# Patient Record
Sex: FEMALE | Race: WHITE | NOT HISPANIC OR LATINO
[De-identification: names, ages, dates, MRNs, and addresses within clinical notes are randomized per-mention and may not be internally consistent; named-entity substitution may affect disease eponyms.]

---

## 2019-12-10 ENCOUNTER — APPOINTMENT (OUTPATIENT)
Dept: UROLOGY | Facility: CLINIC | Age: 64
End: 2019-12-10

## 2020-11-27 ENCOUNTER — OUTPATIENT (OUTPATIENT)
Dept: OUTPATIENT SERVICES | Facility: HOSPITAL | Age: 65
LOS: 1 days | Discharge: HOME | End: 2020-11-27
Payer: COMMERCIAL

## 2020-11-27 VITALS
HEART RATE: 88 BPM | DIASTOLIC BLOOD PRESSURE: 73 MMHG | HEIGHT: 66 IN | TEMPERATURE: 98 F | SYSTOLIC BLOOD PRESSURE: 142 MMHG | OXYGEN SATURATION: 97 % | RESPIRATION RATE: 16 BRPM | WEIGHT: 179.9 LBS

## 2020-11-27 DIAGNOSIS — Z98.890 OTHER SPECIFIED POSTPROCEDURAL STATES: Chronic | ICD-10-CM

## 2020-11-27 DIAGNOSIS — S92.414A NONDISPLACED FRACTURE OF PROXIMAL PHALANX OF RIGHT GREAT TOE, INITIAL ENCOUNTER FOR CLOSED FRACTURE: ICD-10-CM

## 2020-11-27 DIAGNOSIS — Z01.818 ENCOUNTER FOR OTHER PREPROCEDURAL EXAMINATION: ICD-10-CM

## 2020-11-27 DIAGNOSIS — Z98.84 BARIATRIC SURGERY STATUS: Chronic | ICD-10-CM

## 2020-11-27 DIAGNOSIS — S92.414D NONDISPLACED FRACTURE OF PROXIMAL PHALANX OF RIGHT GREAT TOE, SUBSEQUENT ENCOUNTER FOR FRACTURE WITH ROUTINE HEALING: ICD-10-CM

## 2020-11-27 LAB
ALBUMIN SERPL ELPH-MCNC: 4.4 G/DL — SIGNIFICANT CHANGE UP (ref 3.5–5.2)
ALP SERPL-CCNC: 84 U/L — SIGNIFICANT CHANGE UP (ref 30–115)
ALT FLD-CCNC: 13 U/L — SIGNIFICANT CHANGE UP (ref 0–41)
ANION GAP SERPL CALC-SCNC: 11 MMOL/L — SIGNIFICANT CHANGE UP (ref 7–14)
APPEARANCE UR: ABNORMAL
APTT BLD: 30 SEC — SIGNIFICANT CHANGE UP (ref 27–39.2)
AST SERPL-CCNC: 15 U/L — SIGNIFICANT CHANGE UP (ref 0–41)
BACTERIA # UR AUTO: NEGATIVE — SIGNIFICANT CHANGE UP
BASOPHILS # BLD AUTO: 0.1 K/UL — SIGNIFICANT CHANGE UP (ref 0–0.2)
BASOPHILS NFR BLD AUTO: 1.7 % — HIGH (ref 0–1)
BILIRUB SERPL-MCNC: 0.4 MG/DL — SIGNIFICANT CHANGE UP (ref 0.2–1.2)
BILIRUB UR-MCNC: NEGATIVE — SIGNIFICANT CHANGE UP
BUN SERPL-MCNC: 11 MG/DL — SIGNIFICANT CHANGE UP (ref 10–20)
CALCIUM SERPL-MCNC: 9.6 MG/DL — SIGNIFICANT CHANGE UP (ref 8.5–10.1)
CHLORIDE SERPL-SCNC: 104 MMOL/L — SIGNIFICANT CHANGE UP (ref 98–110)
CO2 SERPL-SCNC: 25 MMOL/L — SIGNIFICANT CHANGE UP (ref 17–32)
COLOR SPEC: YELLOW — SIGNIFICANT CHANGE UP
COMMENT - URINE: SIGNIFICANT CHANGE UP
CREAT SERPL-MCNC: 1.1 MG/DL — SIGNIFICANT CHANGE UP (ref 0.7–1.5)
DIFF PNL FLD: NEGATIVE — SIGNIFICANT CHANGE UP
EOSINOPHIL # BLD AUTO: 0.14 K/UL — SIGNIFICANT CHANGE UP (ref 0–0.7)
EOSINOPHIL NFR BLD AUTO: 2.3 % — SIGNIFICANT CHANGE UP (ref 0–8)
EPI CELLS # UR: 26 /HPF — HIGH (ref 0–5)
GLUCOSE SERPL-MCNC: 89 MG/DL — SIGNIFICANT CHANGE UP (ref 70–99)
GLUCOSE UR QL: NEGATIVE — SIGNIFICANT CHANGE UP
HCT VFR BLD CALC: 41.4 % — SIGNIFICANT CHANGE UP (ref 37–47)
HGB BLD-MCNC: 13.2 G/DL — SIGNIFICANT CHANGE UP (ref 12–16)
HYALINE CASTS # UR AUTO: 6 /LPF — SIGNIFICANT CHANGE UP (ref 0–7)
IMM GRANULOCYTES NFR BLD AUTO: 0.2 % — SIGNIFICANT CHANGE UP (ref 0.1–0.3)
INR BLD: 0.98 RATIO — SIGNIFICANT CHANGE UP (ref 0.65–1.3)
KETONES UR-MCNC: NEGATIVE — SIGNIFICANT CHANGE UP
LEUKOCYTE ESTERASE UR-ACNC: NEGATIVE — SIGNIFICANT CHANGE UP
LYMPHOCYTES # BLD AUTO: 1.68 K/UL — SIGNIFICANT CHANGE UP (ref 1.2–3.4)
LYMPHOCYTES # BLD AUTO: 28 % — SIGNIFICANT CHANGE UP (ref 20.5–51.1)
MCHC RBC-ENTMCNC: 29.9 PG — SIGNIFICANT CHANGE UP (ref 27–31)
MCHC RBC-ENTMCNC: 31.9 G/DL — LOW (ref 32–37)
MCV RBC AUTO: 93.9 FL — SIGNIFICANT CHANGE UP (ref 81–99)
MONOCYTES # BLD AUTO: 0.44 K/UL — SIGNIFICANT CHANGE UP (ref 0.1–0.6)
MONOCYTES NFR BLD AUTO: 7.3 % — SIGNIFICANT CHANGE UP (ref 1.7–9.3)
NEUTROPHILS # BLD AUTO: 3.64 K/UL — SIGNIFICANT CHANGE UP (ref 1.4–6.5)
NEUTROPHILS NFR BLD AUTO: 60.5 % — SIGNIFICANT CHANGE UP (ref 42.2–75.2)
NITRITE UR-MCNC: NEGATIVE — SIGNIFICANT CHANGE UP
NRBC # BLD: 0 /100 WBCS — SIGNIFICANT CHANGE UP (ref 0–0)
PH UR: 6 — SIGNIFICANT CHANGE UP (ref 5–8)
PLATELET # BLD AUTO: 429 K/UL — HIGH (ref 130–400)
POTASSIUM SERPL-MCNC: 5.1 MMOL/L — HIGH (ref 3.5–5)
POTASSIUM SERPL-SCNC: 5.1 MMOL/L — HIGH (ref 3.5–5)
PROT SERPL-MCNC: 6.8 G/DL — SIGNIFICANT CHANGE UP (ref 6–8)
PROT UR-MCNC: ABNORMAL
PROTHROM AB SERPL-ACNC: 11.3 SEC — SIGNIFICANT CHANGE UP (ref 9.95–12.87)
RBC # BLD: 4.41 M/UL — SIGNIFICANT CHANGE UP (ref 4.2–5.4)
RBC # FLD: 13.2 % — SIGNIFICANT CHANGE UP (ref 11.5–14.5)
RBC CASTS # UR COMP ASSIST: 2 /HPF — SIGNIFICANT CHANGE UP (ref 0–4)
SODIUM SERPL-SCNC: 140 MMOL/L — SIGNIFICANT CHANGE UP (ref 135–146)
SP GR SPEC: 1.03 — SIGNIFICANT CHANGE UP (ref 1.01–1.03)
UROBILINOGEN FLD QL: SIGNIFICANT CHANGE UP
WBC # BLD: 6.01 K/UL — SIGNIFICANT CHANGE UP (ref 4.8–10.8)
WBC # FLD AUTO: 6.01 K/UL — SIGNIFICANT CHANGE UP (ref 4.8–10.8)
WBC UR QL: 5 /HPF — SIGNIFICANT CHANGE UP (ref 0–5)

## 2020-11-27 PROCEDURE — 93010 ELECTROCARDIOGRAM REPORT: CPT

## 2020-11-27 RX ORDER — OMEPRAZOLE 10 MG/1
1 CAPSULE, DELAYED RELEASE ORAL
Qty: 0 | Refills: 0 | DISCHARGE

## 2020-11-27 NOTE — H&P PST ADULT - HISTORY OF PRESENT ILLNESS
Pt complains of    Denies any chest pain, difficulty breathing, SOB, palpitations, dysuria, URI, or any other infections in the last 2 weeks/1 month. Denies any recent travel, contact, or exposure to any persons with known or suspected COVID-19. Pt also denies COVID testing within the last 2 weeks. Pt advised to self quarantine until day of procedure. Exercise tolerance of 2-3 flights of stairs without dyspnea. BRONWYN reviewed with patient.    Anesthesia Alert  NO--Difficult Airway  NO--History of neck surgery or radiation  NO--Limited ROM of neck  NO--History of Malignant hyperthermia  NO--Personal or family history of Pseudocholinesterase deficiency  NO--Prior Anesthesia Complication  NO--Latex Allergy  NO--Loose teeth  NO--History of Rheumatoid Arthritis  NO--BRONWYN  NO--Other   Pt complains of right foot pain. As per pt she fell off her bad two weeks ago fracturing her fot. Currently pt is no-weight weight bearing and ambulates with a scooter. Pt reports intermittent pain 5/10, and takes Nabumetone with relief.    Denies any chest pain, difficulty breathing, SOB, palpitations, dysuria, URI, or any other infections in the last 2 weeks/1 month. Denies any recent travel, contact, or exposure to any persons with known or suspected COVID-19. Pt also denies COVID testing within the last 2 weeks. Pt advised to self quarantine until day of procedure. Exercise tolerance of 2-3 flights of stairs without dyspnea. BRONWYN reviewed with patient.    Anesthesia Alert  NO--Difficult Airway  NO--History of neck surgery or radiation  NO--Limited ROM of neck  NO--History of Malignant hyperthermia  NO--Personal or family history of Pseudocholinesterase deficiency  NO--Prior Anesthesia Complication  NO--Latex Allergy  NO--Loose teeth  NO--History of Rheumatoid Arthritis  NO--BRONWYN  NO--Other

## 2020-11-27 NOTE — H&P PST ADULT - GASTROINTESTINAL DETAILS
no rebound tenderness/no rigidity/nontender/no organomegaly/no distention/no masses palpable/no guarding/normal/soft/bowel sounds normal/no bruit

## 2020-11-27 NOTE — H&P PST ADULT - NSICDXPASTMEDICALHX_GEN_ALL_CORE_FT
PAST MEDICAL HISTORY:  Foot fracture, right x 3    Hypothyroidism      PAST MEDICAL HISTORY:  Foot fracture, right x 3    GERD (gastroesophageal reflux disease)     Hypothyroidism

## 2020-11-27 NOTE — H&P PST ADULT - NSANTHOSAYNRD_GEN_A_CORE
No. BRONWYN screening performed.  STOP BANG Legend: 0-2 = LOW Risk; 3-4 = INTERMEDIATE Risk; 5-8 = HIGH Risk

## 2020-11-27 NOTE — H&P PST ADULT - NSICDXPASTSURGICALHX_GEN_ALL_CORE_FT
PAST SURGICAL HISTORY:  LAP-BAND surgery status 2009     PAST SURGICAL HISTORY:  H/O abdominoplasty 2013    LAP-BAND surgery status 2009

## 2020-11-30 ENCOUNTER — OUTPATIENT (OUTPATIENT)
Dept: OUTPATIENT SERVICES | Facility: HOSPITAL | Age: 65
LOS: 1 days | Discharge: HOME | End: 2020-11-30

## 2020-11-30 ENCOUNTER — LABORATORY RESULT (OUTPATIENT)
Age: 65
End: 2020-11-30

## 2020-11-30 DIAGNOSIS — Z98.890 OTHER SPECIFIED POSTPROCEDURAL STATES: Chronic | ICD-10-CM

## 2020-11-30 DIAGNOSIS — Z11.59 ENCOUNTER FOR SCREENING FOR OTHER VIRAL DISEASES: ICD-10-CM

## 2020-11-30 DIAGNOSIS — Z98.84 BARIATRIC SURGERY STATUS: Chronic | ICD-10-CM

## 2020-11-30 PROBLEM — K21.9 GASTRO-ESOPHAGEAL REFLUX DISEASE WITHOUT ESOPHAGITIS: Chronic | Status: ACTIVE | Noted: 2020-11-27

## 2020-11-30 PROBLEM — S92.901A UNSPECIFIED FRACTURE OF RIGHT FOOT, INITIAL ENCOUNTER FOR CLOSED FRACTURE: Chronic | Status: ACTIVE | Noted: 2020-11-27

## 2020-11-30 PROBLEM — E03.9 HYPOTHYROIDISM, UNSPECIFIED: Chronic | Status: ACTIVE | Noted: 2020-11-27

## 2020-12-03 ENCOUNTER — OUTPATIENT (OUTPATIENT)
Dept: OUTPATIENT SERVICES | Facility: HOSPITAL | Age: 65
LOS: 1 days | Discharge: HOME | End: 2020-12-03

## 2020-12-03 VITALS
OXYGEN SATURATION: 99 % | HEART RATE: 62 BPM | DIASTOLIC BLOOD PRESSURE: 66 MMHG | SYSTOLIC BLOOD PRESSURE: 119 MMHG | RESPIRATION RATE: 14 BRPM

## 2020-12-03 VITALS
DIASTOLIC BLOOD PRESSURE: 68 MMHG | WEIGHT: 179.9 LBS | TEMPERATURE: 98 F | HEIGHT: 66 IN | RESPIRATION RATE: 18 BRPM | OXYGEN SATURATION: 99 % | HEART RATE: 73 BPM | SYSTOLIC BLOOD PRESSURE: 146 MMHG

## 2020-12-03 DIAGNOSIS — Z98.84 BARIATRIC SURGERY STATUS: Chronic | ICD-10-CM

## 2020-12-03 DIAGNOSIS — Z98.890 OTHER SPECIFIED POSTPROCEDURAL STATES: Chronic | ICD-10-CM

## 2020-12-03 RX ORDER — CEPHALEXIN 500 MG
1 CAPSULE ORAL
Qty: 28 | Refills: 0
Start: 2020-12-03 | End: 2020-12-09

## 2020-12-03 RX ORDER — IBUPROFEN 200 MG
1 TABLET ORAL
Qty: 90 | Refills: 0
Start: 2020-12-03 | End: 2021-01-01

## 2020-12-03 RX ORDER — MEPERIDINE HYDROCHLORIDE 50 MG/ML
12.5 INJECTION INTRAMUSCULAR; INTRAVENOUS; SUBCUTANEOUS ONCE
Refills: 0 | Status: DISCONTINUED | OUTPATIENT
Start: 2020-12-03 | End: 2020-12-03

## 2020-12-03 RX ORDER — ASPIRIN/CALCIUM CARB/MAGNESIUM 324 MG
1 TABLET ORAL
Qty: 60 | Refills: 0
Start: 2020-12-03 | End: 2021-01-01

## 2020-12-03 RX ORDER — HYDROMORPHONE HYDROCHLORIDE 2 MG/ML
0.5 INJECTION INTRAMUSCULAR; INTRAVENOUS; SUBCUTANEOUS
Refills: 0 | Status: DISCONTINUED | OUTPATIENT
Start: 2020-12-03 | End: 2020-12-03

## 2020-12-03 RX ORDER — ONDANSETRON 8 MG/1
4 TABLET, FILM COATED ORAL ONCE
Refills: 0 | Status: DISCONTINUED | OUTPATIENT
Start: 2020-12-03 | End: 2020-12-17

## 2020-12-03 RX ORDER — OXYCODONE HYDROCHLORIDE 5 MG/1
5 TABLET ORAL ONCE
Refills: 0 | Status: DISCONTINUED | OUTPATIENT
Start: 2020-12-03 | End: 2020-12-03

## 2020-12-03 RX ORDER — HYDROMORPHONE HYDROCHLORIDE 2 MG/ML
1 INJECTION INTRAMUSCULAR; INTRAVENOUS; SUBCUTANEOUS
Refills: 0 | Status: DISCONTINUED | OUTPATIENT
Start: 2020-12-03 | End: 2020-12-03

## 2020-12-03 RX ORDER — SODIUM CHLORIDE 9 MG/ML
1000 INJECTION, SOLUTION INTRAVENOUS
Refills: 0 | Status: DISCONTINUED | OUTPATIENT
Start: 2020-12-03 | End: 2020-12-17

## 2020-12-03 RX ADMIN — HYDROMORPHONE HYDROCHLORIDE 1 MILLIGRAM(S): 2 INJECTION INTRAMUSCULAR; INTRAVENOUS; SUBCUTANEOUS at 09:02

## 2020-12-03 RX ADMIN — SODIUM CHLORIDE 100 MILLILITER(S): 9 INJECTION, SOLUTION INTRAVENOUS at 09:15

## 2020-12-03 RX ADMIN — OXYCODONE HYDROCHLORIDE 5 MILLIGRAM(S): 5 TABLET ORAL at 10:20

## 2020-12-03 NOTE — BRIEF OPERATIVE NOTE - NSICDXBRIEFPOSTOP_GEN_ALL_CORE_FT
POST-OP DIAGNOSIS:  Sprain of tarsometatarsal ligament of right foot, initial encounter 03-Dec-2020 09:03:10  Mily Durham

## 2020-12-03 NOTE — ASU DISCHARGE PLAN (ADULT/PEDIATRIC) - ASU DC SPECIAL INSTRUCTIONSFT
nonweightbearing right lower extremity.  elevate, ice, pain control.  baby aspirin twice a day for DVT prophylaxis.  1 week of oral antibiotics.  follow up in 2 weeks for suture removal and xrays.  bring CAM boot to follow up appt.

## 2020-12-03 NOTE — ASU DISCHARGE PLAN (ADULT/PEDIATRIC) - CALL YOUR DOCTOR IF YOU HAVE ANY OF THE FOLLOWING:
Bleeding that does not stop/Swelling that gets worse/Numbness, tingling, color or temperature change to extremity/Wound/Surgical Site with redness, or foul smelling discharge or pus/Pain not relieved by Medications/Fever greater than (need to indicate Fahrenheit or Celsius)

## 2020-12-03 NOTE — ASU DISCHARGE PLAN (ADULT/PEDIATRIC) - CARE PROVIDER_API CALL
JOELLE WATSON  ORTHOPAEDIC SURGERY  [address] [place] [address]  Phone: [phone]  Fax: [phone]  Established Patient  Follow Up Time: 2 weeks

## 2020-12-03 NOTE — BRIEF OPERATIVE NOTE - NSICDXBRIEFPREOP_GEN_ALL_CORE_FT
PRE-OP DIAGNOSIS:  Sprain of tarsometatarsal ligament of right foot, initial encounter 03-Dec-2020 09:03:02  Mily Durham

## 2020-12-03 NOTE — ASU DISCHARGE PLAN (ADULT/PEDIATRIC) - PATIENT EDUCATION MATERIALS PROVIED
pain management/Provider pre-printed instructions given/Pre-printed instructions given for other (specify)

## 2020-12-03 NOTE — BRIEF OPERATIVE NOTE - NSICDXBRIEFPROCEDURE_GEN_ALL_CORE_FT
PROCEDURES:  Open reduction and internal fixation (ORIF) of tarsometatarsal joint of right foot 03-Dec-2020 09:02:04  Mily Durham

## 2020-12-05 DIAGNOSIS — S93.324A DISLOCATION OF TARSOMETATARSAL JOINT OF RIGHT FOOT, INITIAL ENCOUNTER: ICD-10-CM
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
## 2020-12-05 DIAGNOSIS — E03.9 HYPOTHYROIDISM, UNSPECIFIED: ICD-10-CM

## 2020-12-05 DIAGNOSIS — Z98.84 BARIATRIC SURGERY STATUS: ICD-10-CM

## 2020-12-05 DIAGNOSIS — E66.9 OBESITY, UNSPECIFIED: ICD-10-CM

## 2020-12-05 DIAGNOSIS — K21.9 GASTRO-ESOPHAGEAL REFLUX DISEASE WITHOUT ESOPHAGITIS: ICD-10-CM
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
## 2020-12-05 DIAGNOSIS — Z79.82 LONG TERM (CURRENT) USE OF ASPIRIN: ICD-10-CM

## 2022-06-09 ENCOUNTER — INPATIENT (INPATIENT)
Facility: HOSPITAL | Age: 67
LOS: 1 days | Discharge: HOME | End: 2022-06-11
Attending: INTERNAL MEDICINE | Admitting: INTERNAL MEDICINE
Payer: COMMERCIAL

## 2022-06-09 VITALS
RESPIRATION RATE: 18 BRPM | WEIGHT: 179.9 LBS | HEART RATE: 73 BPM | OXYGEN SATURATION: 99 % | HEIGHT: 66 IN | DIASTOLIC BLOOD PRESSURE: 52 MMHG | SYSTOLIC BLOOD PRESSURE: 106 MMHG | TEMPERATURE: 96 F

## 2022-06-09 DIAGNOSIS — Z98.84 BARIATRIC SURGERY STATUS: Chronic | ICD-10-CM

## 2022-06-09 DIAGNOSIS — Z98.890 OTHER SPECIFIED POSTPROCEDURAL STATES: Chronic | ICD-10-CM

## 2022-06-09 LAB
ALBUMIN SERPL ELPH-MCNC: 4.3 G/DL — SIGNIFICANT CHANGE UP (ref 3.5–5.2)
ALP SERPL-CCNC: 104 U/L — SIGNIFICANT CHANGE UP (ref 30–115)
ALT FLD-CCNC: 20 U/L — SIGNIFICANT CHANGE UP (ref 0–41)
ANION GAP SERPL CALC-SCNC: 13 MMOL/L — SIGNIFICANT CHANGE UP (ref 7–14)
AST SERPL-CCNC: 23 U/L — SIGNIFICANT CHANGE UP (ref 0–41)
BASOPHILS # BLD AUTO: 0.05 K/UL — SIGNIFICANT CHANGE UP (ref 0–0.2)
BASOPHILS NFR BLD AUTO: 0.6 % — SIGNIFICANT CHANGE UP (ref 0–1)
BILIRUB DIRECT SERPL-MCNC: <0.2 MG/DL — SIGNIFICANT CHANGE UP (ref 0–0.3)
BILIRUB INDIRECT FLD-MCNC: >0.1 MG/DL — LOW (ref 0.2–1.2)
BILIRUB SERPL-MCNC: 0.3 MG/DL — SIGNIFICANT CHANGE UP (ref 0.2–1.2)
BUN SERPL-MCNC: 14 MG/DL — SIGNIFICANT CHANGE UP (ref 10–20)
C DIFF BY PCR RESULT: NEGATIVE — SIGNIFICANT CHANGE UP
C DIFF TOX GENS STL QL NAA+PROBE: SIGNIFICANT CHANGE UP
CALCIUM SERPL-MCNC: 8.8 MG/DL — SIGNIFICANT CHANGE UP (ref 8.5–10.1)
CHLORIDE SERPL-SCNC: 102 MMOL/L — SIGNIFICANT CHANGE UP (ref 98–110)
CO2 SERPL-SCNC: 22 MMOL/L — SIGNIFICANT CHANGE UP (ref 17–32)
CREAT SERPL-MCNC: 1.1 MG/DL — SIGNIFICANT CHANGE UP (ref 0.7–1.5)
EGFR: 56 ML/MIN/1.73M2 — LOW
EOSINOPHIL # BLD AUTO: 0.12 K/UL — SIGNIFICANT CHANGE UP (ref 0–0.7)
EOSINOPHIL NFR BLD AUTO: 1.5 % — SIGNIFICANT CHANGE UP (ref 0–8)
GLUCOSE SERPL-MCNC: 118 MG/DL — HIGH (ref 70–99)
HCT VFR BLD CALC: 40.6 % — SIGNIFICANT CHANGE UP (ref 37–47)
HGB BLD-MCNC: 13 G/DL — SIGNIFICANT CHANGE UP (ref 12–16)
IMM GRANULOCYTES NFR BLD AUTO: 0.2 % — SIGNIFICANT CHANGE UP (ref 0.1–0.3)
LACTATE SERPL-SCNC: 0.9 MMOL/L — SIGNIFICANT CHANGE UP (ref 0.7–2)
LIDOCAIN IGE QN: 23 U/L — SIGNIFICANT CHANGE UP (ref 7–60)
LYMPHOCYTES # BLD AUTO: 1.66 K/UL — SIGNIFICANT CHANGE UP (ref 1.2–3.4)
LYMPHOCYTES # BLD AUTO: 20.2 % — LOW (ref 20.5–51.1)
MCHC RBC-ENTMCNC: 29 PG — SIGNIFICANT CHANGE UP (ref 27–31)
MCHC RBC-ENTMCNC: 32 G/DL — SIGNIFICANT CHANGE UP (ref 32–37)
MCV RBC AUTO: 90.4 FL — SIGNIFICANT CHANGE UP (ref 81–99)
MONOCYTES # BLD AUTO: 0.76 K/UL — HIGH (ref 0.1–0.6)
MONOCYTES NFR BLD AUTO: 9.3 % — SIGNIFICANT CHANGE UP (ref 1.7–9.3)
NEUTROPHILS # BLD AUTO: 5.59 K/UL — SIGNIFICANT CHANGE UP (ref 1.4–6.5)
NEUTROPHILS NFR BLD AUTO: 68.2 % — SIGNIFICANT CHANGE UP (ref 42.2–75.2)
NRBC # BLD: 0 /100 WBCS — SIGNIFICANT CHANGE UP (ref 0–0)
PLATELET # BLD AUTO: 349 K/UL — SIGNIFICANT CHANGE UP (ref 130–400)
POTASSIUM SERPL-MCNC: 3.6 MMOL/L — SIGNIFICANT CHANGE UP (ref 3.5–5)
POTASSIUM SERPL-SCNC: 3.6 MMOL/L — SIGNIFICANT CHANGE UP (ref 3.5–5)
PROT SERPL-MCNC: 6.9 G/DL — SIGNIFICANT CHANGE UP (ref 6–8)
RBC # BLD: 4.49 M/UL — SIGNIFICANT CHANGE UP (ref 4.2–5.4)
RBC # FLD: 13.4 % — SIGNIFICANT CHANGE UP (ref 11.5–14.5)
SARS-COV-2 RNA SPEC QL NAA+PROBE: SIGNIFICANT CHANGE UP
SODIUM SERPL-SCNC: 137 MMOL/L — SIGNIFICANT CHANGE UP (ref 135–146)
TROPONIN T SERPL-MCNC: <0.01 NG/ML — SIGNIFICANT CHANGE UP
WBC # BLD: 8.2 K/UL — SIGNIFICANT CHANGE UP (ref 4.8–10.8)
WBC # FLD AUTO: 8.2 K/UL — SIGNIFICANT CHANGE UP (ref 4.8–10.8)

## 2022-06-09 PROCEDURE — 99285 EMERGENCY DEPT VISIT HI MDM: CPT

## 2022-06-09 PROCEDURE — 74177 CT ABD & PELVIS W/CONTRAST: CPT | Mod: 26,MA

## 2022-06-09 PROCEDURE — 93010 ELECTROCARDIOGRAM REPORT: CPT

## 2022-06-09 PROCEDURE — 99223 1ST HOSP IP/OBS HIGH 75: CPT

## 2022-06-09 PROCEDURE — 99284 EMERGENCY DEPT VISIT MOD MDM: CPT

## 2022-06-09 PROCEDURE — 71045 X-RAY EXAM CHEST 1 VIEW: CPT | Mod: 26

## 2022-06-09 PROCEDURE — 74240 X-RAY XM UPR GI TRC 1CNTRST: CPT | Mod: 26

## 2022-06-09 RX ORDER — ONDANSETRON 8 MG/1
4 TABLET, FILM COATED ORAL ONCE
Refills: 0 | Status: COMPLETED | OUTPATIENT
Start: 2022-06-09 | End: 2022-06-09

## 2022-06-09 RX ORDER — MORPHINE SULFATE 50 MG/1
4 CAPSULE, EXTENDED RELEASE ORAL ONCE
Refills: 0 | Status: DISCONTINUED | OUTPATIENT
Start: 2022-06-09 | End: 2022-06-09

## 2022-06-09 RX ORDER — SODIUM CHLORIDE 9 MG/ML
1000 INJECTION, SOLUTION INTRAVENOUS ONCE
Refills: 0 | Status: COMPLETED | OUTPATIENT
Start: 2022-06-09 | End: 2022-06-09

## 2022-06-09 RX ORDER — DIATRIZOATE MEGLUMINE 180 MG/ML
30 INJECTION, SOLUTION INTRAVESICAL ONCE
Refills: 0 | Status: COMPLETED | OUTPATIENT
Start: 2022-06-09 | End: 2022-06-09

## 2022-06-09 RX ORDER — FAMOTIDINE 10 MG/ML
20 INJECTION INTRAVENOUS ONCE
Refills: 0 | Status: COMPLETED | OUTPATIENT
Start: 2022-06-09 | End: 2022-06-09

## 2022-06-09 RX ORDER — ACETAMINOPHEN 500 MG
650 TABLET ORAL EVERY 6 HOURS
Refills: 0 | Status: DISCONTINUED | OUTPATIENT
Start: 2022-06-09 | End: 2022-06-11

## 2022-06-09 RX ORDER — SODIUM CHLORIDE 9 MG/ML
1000 INJECTION INTRAMUSCULAR; INTRAVENOUS; SUBCUTANEOUS ONCE
Refills: 0 | Status: COMPLETED | OUTPATIENT
Start: 2022-06-09 | End: 2022-06-09

## 2022-06-09 RX ORDER — NABUMETONE 750 MG
2 TABLET ORAL
Qty: 0 | Refills: 0 | DISCHARGE

## 2022-06-09 RX ORDER — PANTOPRAZOLE SODIUM 20 MG/1
40 TABLET, DELAYED RELEASE ORAL
Refills: 0 | Status: DISCONTINUED | OUTPATIENT
Start: 2022-06-09 | End: 2022-06-11

## 2022-06-09 RX ORDER — LEVOTHYROXINE SODIUM 125 MCG
88 TABLET ORAL DAILY
Refills: 0 | Status: DISCONTINUED | OUTPATIENT
Start: 2022-06-09 | End: 2022-06-11

## 2022-06-09 RX ADMIN — ONDANSETRON 4 MILLIGRAM(S): 8 TABLET, FILM COATED ORAL at 02:16

## 2022-06-09 RX ADMIN — ONDANSETRON 4 MILLIGRAM(S): 8 TABLET, FILM COATED ORAL at 06:00

## 2022-06-09 RX ADMIN — DIATRIZOATE MEGLUMINE 30 MILLILITER(S): 180 INJECTION, SOLUTION INTRAVESICAL at 02:34

## 2022-06-09 RX ADMIN — FAMOTIDINE 20 MILLIGRAM(S): 10 INJECTION INTRAVENOUS at 02:16

## 2022-06-09 RX ADMIN — MORPHINE SULFATE 4 MILLIGRAM(S): 50 CAPSULE, EXTENDED RELEASE ORAL at 02:16

## 2022-06-09 RX ADMIN — MORPHINE SULFATE 4 MILLIGRAM(S): 50 CAPSULE, EXTENDED RELEASE ORAL at 06:00

## 2022-06-09 RX ADMIN — Medication 650 MILLIGRAM(S): at 17:51

## 2022-06-09 RX ADMIN — MORPHINE SULFATE 4 MILLIGRAM(S): 50 CAPSULE, EXTENDED RELEASE ORAL at 11:10

## 2022-06-09 RX ADMIN — Medication 650 MILLIGRAM(S): at 17:02

## 2022-06-09 RX ADMIN — ONDANSETRON 4 MILLIGRAM(S): 8 TABLET, FILM COATED ORAL at 03:05

## 2022-06-09 RX ADMIN — MORPHINE SULFATE 4 MILLIGRAM(S): 50 CAPSULE, EXTENDED RELEASE ORAL at 10:39

## 2022-06-09 RX ADMIN — SODIUM CHLORIDE 1000 MILLILITER(S): 9 INJECTION, SOLUTION INTRAVENOUS at 03:52

## 2022-06-09 RX ADMIN — SODIUM CHLORIDE 1000 MILLILITER(S): 9 INJECTION, SOLUTION INTRAVENOUS at 06:21

## 2022-06-09 RX ADMIN — ONDANSETRON 4 MILLIGRAM(S): 8 TABLET, FILM COATED ORAL at 08:50

## 2022-06-09 RX ADMIN — SODIUM CHLORIDE 1000 MILLILITER(S): 9 INJECTION INTRAMUSCULAR; INTRAVENOUS; SUBCUTANEOUS at 02:20

## 2022-06-09 RX ADMIN — MORPHINE SULFATE 4 MILLIGRAM(S): 50 CAPSULE, EXTENDED RELEASE ORAL at 03:14

## 2022-06-09 NOTE — ED PROVIDER NOTE - ATTENDING APP SHARED VISIT CONTRIBUTION OF CARE
66-year-old female with history of GERD, hypothyroidism, gastric sleeve done 12 years ago, here for assessment of episodic abdominal pain and profuse diarrhea.  Patient notes symptoms have been on and off for 1 month or significantly worse today.  Patient had stool incontinence which she has not had previously.  She is also had multiple episodes of nausea, nonbloody, nonbilious vomiting.  Patient notes that after large BM she felt lightheaded and passed out.  No head trauma.    Patient tachy, appears uncomfortable.  She has clear lungs, regular rhythm, hyperactive bowel sounds, diffusely tender, nondistended. She appears slightly dehydrated with cracked lips.    Labs are normal without signs of acute infectious process, pancreatitis, biliary disease or profound dehydration.    Prelim read on CT scan demonstrates slow transition of contrast but also dilated, fluid-filled loops of bowel suggestive of either ileus or diarrhea.    Stool cultures and C. difficile are pending.    While reassessing the patient she was noted to become significantly more tachycardic, EKG done at that time noted a flutter with RVR.  At the same reassessment, patient went back into normal sinus at regular rate.    Concern for structural abnormalities secondary to bariatric surgery versus infectious/inflammatory diarrhea.  Profuse diarrhea likely causing arrhythmia, however in setting of syncope, have to consider primary arrhythmia as well.    Surgical consult pending for further recommendations specifically whether the patient needs to be transferred to the [facility] site for bariatric eval.    Regardless, will need admission to telemetry for new onset, possibly paroxysmal a flutter and intractable diarrhea.    Of note, the patient has required 16 mg of morphine and multiple doses of antiemetic while in the ED.

## 2022-06-09 NOTE — ED PROVIDER NOTE - PHYSICAL EXAMINATION
VITAL SIGNS: I have reviewed nursing notes and confirm.  CONSTITUTIONAL: 67 yo F laying on stretcher in mild distress.  SKIN: Skin exam is warm and dry, no acute rash.  HEAD: Normocephalic; atraumatic.  EYES: Conjunctiva and sclera clear.  ENT: No nasal discharge; airway clear.   NECK: Supple; non tender.  CARD: S1, S2 normal; no murmurs, gallops, or rubs. Regular rate and rhythm.  RESP: No wheezes, rales or rhonchi.   ABD: Normal bowel sounds; soft; non-distended; (+) diffuse TTP; No rebound or guarding. No CVA tenderness.  EXT: Normal ROM.   NEURO: Alert, oriented. Grossly unremarkable. No focal deficits.

## 2022-06-09 NOTE — H&P ADULT - ASSESSMENT
66F w/ h/o hypothyroidism, GERD, and s/p gastric band p/w abdominal pain a/w non-bloody, watery diarrhea.    #Abdominal Pain  Initially p/w severe abdominal pain a/w diarrhea. S/P gastric band 2009. Admission CTAP showed evidence of diarrhea and possible ileus. Also showed delayed passage of oral contrast. Follow-up Upper GI series showed esophageal dysmotility and significant delay in passage of contrast through lap band. C diff negative. No acute surgical intervention per bariatric surgery.      #Hypothyroidism  - c/w synthroid 88mcg PO QD  - check TSH    #GERD  - c/w pantoprazole 40mg PO QD (therapeutic interchange for omeprazole 40 QD)    DVT PPX: none indicated (IMPROVEDD score 1)  GI PPX: pantoprazole 40mg PO QD  DIET: Clear Liquids  ACTIVITY: IAT  CODE STATUS: Full Code  DISPOSITION: From Home    PENDING:  66F w/ h/o hypothyroidism, GERD, and s/p gastric band p/w abdominal pain a/w non-bloody, watery diarrhea.    #Abdominal Pain  #Diarrhea  Initially p/w severe abdominal pain a/w diarrhea. S/P gastric band 2009. Admission CTAP showed evidence of diarrhea and possible ileus. Also showed delayed passage of oral contrast. Follow-up Upper GI series showed esophageal dysmotility and significant delay in passage of contrast through lap band. C diff negative. No acute surgical intervention per bariatric surgery.  - f/u stool culture  - f/u GI stool PCR    #Tachycardia  In ED, pt noted to be tachycardic to 's. EKG at the time concerning for aflutter w/ RVR vs sinus tachycardia w/ PVC's. Spontaneously converted back to NSR w/o intervention.  - c/w telemetry monitoring    #Hypothyroidism  - c/w synthroid 88mcg PO QD  - check TSH    #GERD  - c/w pantoprazole 40mg PO QD (therapeutic interchange for omeprazole 40 QD)    DVT PPX: none indicated (IMPROVEDD score 1)  GI PPX: pantoprazole 40mg PO QD  DIET: Clear Liquids  ACTIVITY: IAT  CODE STATUS: Full Code  DISPOSITION: From Home    PENDING: stool cx; GI stool PCR 66F w/ h/o hypothyroidism, GERD, and s/p gastric band p/w abdominal pain a/w non-bloody, watery diarrhea.    #Abdominal Pain  #Diarrhea  Initially p/w severe abdominal pain a/w diarrhea. S/P gastric band 2009. Admission CTAP showed evidence of diarrhea and possible ileus. Also showed delayed passage of oral contrast. Follow-up Upper GI series showed esophageal dysmotility and significant delay in passage of contrast through lap band. C diff negative. No acute surgical intervention per bariatric surgery.  - Diet: Clear Liquids (per bariatric sx after reviewing upper GI)  - f/u stool culture, GI stool PCR, calprotectin, and lactoferrin  - consider GI consult for inpatient EGD per bariatric sx    #Tachycardia  In ED, pt noted to be tachycardic to 's. EKG at the time concerning for aflutter w/ RVR vs sinus tachycardia w/ PVC's. Spontaneously converted back to NSR w/o intervention.  - c/w telemetry monitoring  - repeat EKG in AM    #Hypothyroidism  - c/w synthroid 88mcg PO QD  - check TSH    #GERD  - c/w pantoprazole 40mg PO QD (therapeutic interchange for omeprazole 40 QD)    DVT PPX: none indicated (IMPROVEDD score 1)  GI PPX: pantoprazole 40mg PO QD  DIET: Clear Liquids  ACTIVITY: IAT  CODE STATUS: Full Code  DISPOSITION: From Home    PENDING: stool studies (Cx, GI stool PCR, calprotectin, lactoferrin)

## 2022-06-09 NOTE — ED PROVIDER NOTE - NS ED ATTENDING STATEMENT MOD
This was a shared visit with the BUDDY. I reviewed and verified the documentation and independently performed the documented:

## 2022-06-09 NOTE — ED ADULT NURSE NOTE - NSICDXPASTMEDICALHX_GEN_ALL_CORE_FT
PAST MEDICAL HISTORY:  Foot fracture, right x 3    GERD (gastroesophageal reflux disease)     Hypothyroidism

## 2022-06-09 NOTE — ED PROVIDER NOTE - OBJECTIVE STATEMENT
66-year-old female with past medical history of hypothyroidism, GERD and gastric sleeve presents to the ED complaining of persisting diffuse abdominal pain and non-bloody diarrhea over the last week with worsening of symptoms tonight.  Pain is cramp-like, constant, and associated with nausea.  Patient went to the bathroom today after having a bowel movement ended up passing out.  Patient states she did not sustain any head injury.  No seizure-like activity occurred.  Denies any recent travel or sick contacts.  She denies other complaints.

## 2022-06-09 NOTE — ED ADULT TRIAGE NOTE - CHIEF COMPLAINT QUOTE
PT c/o abdominal pain and diarrhea for a few days. PT states she had a syncopal episode in her bathroom. Denies hitting her head.

## 2022-06-09 NOTE — ED PROVIDER NOTE - CARE PLAN
Principal Discharge DX:	Intractable abdominal pain  Secondary Diagnosis:	Diarrhea  Secondary Diagnosis:	Ileus  Secondary Diagnosis:	New onset atrial flutter   1 Principal Discharge DX:	Intractable abdominal pain  Secondary Diagnosis:	Diarrhea  Secondary Diagnosis:	Ileus  Secondary Diagnosis:	New onset atrial flutter  Secondary Diagnosis:	Syncope

## 2022-06-09 NOTE — ED PROVIDER NOTE - CLINICAL SUMMARY MEDICAL DECISION MAKING FREE TEXT BOX
Transfer from [facility].  Patient with bariatric surgery 12 years ago, has ileus on CT.  Given multiple rounds of pain medication antiemetics there.  Mild left lower quadrant tenderness on my exam, no peritonitis.  Overall nontoxic.  Also had atrial fibrillation there which is new diagnosis.  Now in sinus rhythm.  Seen by surgery, and they do not feel needs acute intervention.  Admitted to medicine.

## 2022-06-09 NOTE — H&P ADULT - NSHPSOCIALHISTORY_GEN_ALL_CORE
Pt is  and lives with . Has been retired for 10 years and previously worked for [employer]. No reported tobacco, alcohol, or illicit drug use.

## 2022-06-09 NOTE — CONSULT NOTE ADULT - SUBJECTIVE AND OBJECTIVE BOX
Patient is a 65 y/o F with a pmhx of hypothyroidism, GERD and gastric lap band ~12 years ago at [facility] and abdominoplasty ~7 years ago who reports to the ER complaining of persistent diffuse abdominal pain associated with multiple episodes of non bloody diarrhea and vomiting x 1 week. Abdominal pain described as "sharp" in nature rating it a 10/10 in intensity. Patient states after passing a bowel movement tonight, she had a syncopal episode with worsening abdominal pain prompting evaluation to the ER. Patient states no similar episodes of this in the past. Patient denies fever, chills, melena, hematochezia, hematemesis. CT was done in the ER which shows diarrhea and possible ileus. Patient was seen and examined at bedside. Patient reports improvement of abdominal pain after the morphine that was given in the ER.    PSHx: Gastric lap band ~12 years ago  Abdominoplasty ~7 years ago    etOH: Denies  smoking: Denies  Illicit drugs: Denies       Vital Signs Last 24 Hrs  T(C): 35.9 (09 Jun 2022 03:35), Max: 35.9 (09 Jun 2022 03:35)  T(F): 96.7 (09 Jun 2022 03:35), Max: 96.7 (09 Jun 2022 03:35)  HR: 107 (09 Jun 2022 03:35) (73 - 107)  BP: 119/62 (09 Jun 2022 03:35) (106/52 - 119/62)  RR: 18 (09 Jun 2022 03:35) (18 - 18)  SpO2: 95% (09 Jun 2022 03:35) (95% - 99%)    ++++++++++++++++++++++++++++++++++++++++++++++++++++++++++++++++++++  < from: CT Abdomen and Pelvis w/ Oral Cont and w/ IV Cont (06.09.22 @ 05:26) >  IMPRESSION:      1.  Status post gastric lap band with oral contrast visualized within the   proximal stomach and dilated esophagus. No contrast seen distally.   Findings may suggest delayed delayed passage of oral contrast. Consider   fluoroscopic upper GI series if clinically warranted.  2.  Distal gastric distention and diffusely dilated fluid-filled loops of   small and large bowel extending to the rectum. Findings compatible with   diarrhea and possible ileus.  3.  Mild intrahepatic biliary ductal dilatation is of uncertain etiology.   This may be secondary to generalized ileus as well. Nonemergent   outpatient MRI/MRCP may be of benefit.    --- End of Report ---    BRANDON FLORES MD; Resident Radiologist  This document has been electronically signed.  MESFIN COLLINS MD;Attending Radiologist  This document has been electronically signed. Jun 9 2022  6:09AM    < end of copied text >  +++++++++++++++++++++++++++++++++++++++++++++++++++++++++++++++++++++++++++++    GENERAL: NAD, lying in bed comfortably  HEAD:  Atraumatic, Normocephalic  EYES: EOMI, PERRLA, conjunctiva and sclera clear  ENT: Moist mucous membranes  NECK: Supple, No JVD  CHEST/LUNG: Clear to auscultation bilaterally; No rales, rhonchi, wheezing, or rubs. Unlabored respirations  HEART: Regular rate and rhythm; No murmurs, rubs, or gallops  ABDOMEN: Bowel sounds present; Soft, Nontender, Nondistended. No hepatomegally  EXTREMITIES:  2+ Peripheral Pulses, brisk capillary refill. No clubbing, cyanosis, or edema  NERVOUS SYSTEM:  Alert & Oriented X3, speech clear. No deficits   MSK: FROM all 4 extremities, full and equal strength  SKIN: No rashes or lesions Patient is a 67 y/o F with a pmhx of hypothyroidism, GERD and gastric lap band ~12 years ago at [facility] and abdominoplasty ~7 years ago who reports to the ER complaining of persistent diffuse abdominal pain associated with multiple episodes of non bloody diarrhea and vomiting x 1 week. Abdominal pain described as "sharp" in nature rating it a 10/10 in intensity. Patient states after passing a bowel movement tonight, she had a syncopal episode with worsening abdominal pain prompting evaluation to the ER. Patient states no similar episodes of this in the past. Patient denies fever, chills, melena, hematochezia, hematemesis. CT was done in the ER which shows diarrhea and possible ileus. Patient was seen and examined at bedside. Patient reports improvement of abdominal pain after the morphine that was given in the ER.    PSHx: Gastric lap band ~12 years ago  Abdominoplasty ~7 years ago    etOH: Denies  smoking: Denies  Illicit drugs: Denies       Vital Signs Last 24 Hrs  T(C): 35.9 (09 Jun 2022 03:35), Max: 35.9 (09 Jun 2022 03:35)  T(F): 96.7 (09 Jun 2022 03:35), Max: 96.7 (09 Jun 2022 03:35)  HR: 107 (09 Jun 2022 03:35) (73 - 107)  BP: 119/62 (09 Jun 2022 03:35) (106/52 - 119/62)  RR: 18 (09 Jun 2022 03:35) (18 - 18)  SpO2: 95% (09 Jun 2022 03:35) (95% - 99%)    ++++++++++++++++++++++++++++++++++++++++++++++++++++++++++++++++++++  < from: CT Abdomen and Pelvis w/ Oral Cont and w/ IV Cont (06.09.22 @ 05:26) >  IMPRESSION:      1.  Status post gastric lap band with oral contrast visualized within the   proximal stomach and dilated esophagus. No contrast seen distally.   Findings may suggest delayed delayed passage of oral contrast. Consider   fluoroscopic upper GI series if clinically warranted.  2.  Distal gastric distention and diffusely dilated fluid-filled loops of   small and large bowel extending to the rectum. Findings compatible with   diarrhea and possible ileus.  3.  Mild intrahepatic biliary ductal dilatation is of uncertain etiology.   This may be secondary to generalized ileus as well. Nonemergent   outpatient MRI/MRCP may be of benefit.    --- End of Report ---    BRANDON FLORES MD; Resident Radiologist  This document has been electronically signed.  MESFIN COLLINS MD;Attending Radiologist  This document has been electronically signed. Jun 9 2022  6:09AM    < end of copied text >  +++++++++++++++++++++++++++++++++++++++++++++++++++++++++++++++++++++++++++++    GENERAL: NAD, lying in bed comfortably  HEAD:  Atraumatic, Normocephalic  EYES: EOMI, PERRLA, conjunctiva and sclera clear  ENT: Moist mucous membranes  NECK: Supple, No JVD  CHEST/LUNG: Clear to auscultation bilaterally; No rales, rhonchi, wheezing, or rubs. Unlabored respirations  HEART: Regular rate and rhythm; No murmurs, rubs, or gallops  ABDOMEN: Bowel sounds present; Soft, (+) tenderness to epigastric and mid-LLQ, Nondistended. No hepatomegally  EXTREMITIES:  2+ Peripheral Pulses, brisk capillary refill. No clubbing, cyanosis, or edema  NERVOUS SYSTEM:  Alert & Oriented X3, speech clear. No deficits   MSK: FROM all 4 extremities, full and equal strength  SKIN: No rashes or lesions

## 2022-06-09 NOTE — ED PROVIDER NOTE - NS ED ROS FT
Review of Systems  Constitutional:  No fever, chills.  Eyes:  No visual changes, eye pain, or discharge.  ENMT:  No hearing changes, pain, or discharge. No nasal congestion, discharge, or bleeding. No throat pain, swelling, or difficulty swallowing.  Cardiac:  No chest pain, palpitations, syncope, or edema.  Respiratory:  No dyspnea, cough. No hemoptysis.  GI:  (+) nausea, abd pain, diarrhea  :  No dysuria, hematuria, frequency, or burning.   MS:  No back pain.  Skin:  No skin rash, pruritis, jaundice, or lesions.  Neuro:  No headache, dizziness, loss of sensation, or focal weakness.  No change in mental status.

## 2022-06-09 NOTE — CONSULT NOTE ADULT - ASSESSMENT
Assessment:  Patient is a 67 y/o F with a pmhx of hypothyroidism, GERD and gastric lap band ~12 years ago at [facility] and abdominoplasty ~7 years ago who reports to the ER complaining of persistent diffuse abdominal pain associated with multiple episodes of non bloody diarrhea and vomiting x 1 week.    Plan:        Above discussed with Dr. Bell Assessment:  Patient is a 65 y/o F with a pmhx of hypothyroidism, GERD and gastric lap band ~12 years ago at [facility] and abdominoplasty ~7 years ago who reports to the ER complaining of persistent diffuse abdominal pain associated with multiple episodes of non bloody diarrhea and vomiting x 1 week.    Plan:  Monitor labs and vital signs.  Pain control.  Patient to be transferred to [facility] to bariatric team for further management.       Above discussed with Dr. Bell Assessment:  Patient is a 67 y/o F with a pmhx of hypothyroidism, GERD and gastric lap band ~12 years ago at [facility] and abdominoplasty ~7 years ago who reports to the ER complaining of persistent diffuse abdominal pain associated with multiple episodes of non bloody diarrhea and vomiting x 1 week.    Plan:  Monitor labs and vital signs.  Pain control.  Patient to be transferred to [facility] to bariatric team for further management.     Case discussed with Dr. Martinez from general surgery team at [facility].     Above discussed with Dr. Bell

## 2022-06-09 NOTE — H&P ADULT - HISTORY OF PRESENT ILLNESS
66F w/ h/o hypothyroidism, GERD, and s/p gastric sleeve p/w abdominal pain a/w non-bloody, watery diarrhea. Diarrhea has been intermittently ongoing for 1 month. However, abdominal pain started last week. Pain described as persistent, diffuse, and cramping in nature, but has been gradually worsening over past week. Unknown aggravating factors, but alleviated w/ opiates (received morphine in ED w/ significant relief). Came to ED after pt found passed out on floor secondary to pain. Patient has never experienced anything similar before. No head trauma or headache. No reported fevers, chills, CP, palpitations, SOB, vomiting, dysuria, or urinary frequency.    Initially presented to [facility], but transferred to [facility] for evaluation by bariatric surgery team. Prior to transfer, pt tachy to 's. EKG showed aflutter vs sinus tach w/ PAC's. Subsequently admitted to telemetry for further workup. 66F w/ h/o hypothyroidism, GERD, and s/p gastric band p/w abdominal pain a/w non-bloody, watery diarrhea. Diarrhea has been ongoing intermittently over past 1 month. However, abdominal pain started two days ago. Pain described as persistent, diffuse, and cramping in nature, but has been gradually worsening over past week. Unknown aggravating factors, but alleviated w/ opiates (received morphine in ED w/ significant relief). Patient has experienced similar pain before over past few months, but previous pain episodes self-resolved after a few minutes.  Came to ED after pt found passed out on floor by . Patient unclear how she ended up on floor, but reports that she has "passed out" several times in the past. No loss of urinary continence or confusion afterwards. No head trauma or headache. No reported fevers, chills, CP, palpitations, SOB, vomiting, dysuria, or urinary frequency.    Initially presented to [facility], but transferred to [facility] for evaluation by bariatric surgery team. Prior to transfer, pt tachy to 's. EKG showed aflutter vs sinus tach w/ PAC's. Subsequently admitted to telemetry for further workup.

## 2022-06-09 NOTE — H&P ADULT - NSHPPHYSICALEXAM_GEN_ALL_CORE
VITALS  T(C): 36.7 (06-09-22 @ 15:43), Max: 36.7 (06-09-22 @ 15:43)  HR: 99 (06-09-22 @ 15:43) (65 - 107)  BP: 99/54 (06-09-22 @ 15:43) (99/54 - 133/66)  RR: 18 (06-09-22 @ 15:43) (18 - 18)  SpO2: 98% (06-09-22 @ 15:43) (95% - 100%)    PHYSICAL EXAM  GENERAL: NAD, well-developed  NEURO: AO x3, PERRLA, EOMI, motor strength intact in 4/4 extremities, sensation intact  HEAD:  Atraumatic, Normocephalic  EYES: conjunctiva and sclera clear  NECK: Supple, No JVD, no lymphadenopathy, no thyromegaly  CHEST/LUNG: Clear to auscultation bilaterally; No wheezes, rales or rhonchi  HEART: Regular rate and rhythm; No murmurs, rubs, or gallops  ABDOMEN: Soft, Nontender, Nondistended; Bowel sounds present, no masses.  EXTREMITIES:  2+ Peripheral Pulses, No clubbing, cyanosis, or edema  SKIN: Warm, dry, intact, no rashes or lesions  PSYCH: affect appropriate

## 2022-06-09 NOTE — ED PROVIDER NOTE - PROGRESS NOTE DETAILS
While reassessing pt noted afib/flutter on monitor--EKG done at the time shows aFlutter @ 124. Pt denies chest pain, SOB, palpitaitons. Pt denies prior hx of aFib/flutter. Ara: case signed out to Dr. Hankins Pt evaluated by surgery team at [facility], recommending transfer north for bariatric eval. Discussed case with Dr. Hobson, aware of transfer. Ara: [facility] surgical team recommends transfer to [facility] for bariatric eval. Patient and family aware, Dr. Hobson accepts transfer. BK: [place] from [place] and reports diarrhea for one month. Suprapubic abdominal tenderness. Surgery at bedside. Repeat EKG in progress. Denies any vision changes, dizziness, n/w/t, headache, CP, SOB.

## 2022-06-09 NOTE — CONSULT NOTE ADULT - ATTENDING COMMENTS
Pt. seen and examined earlier this am with surgery fellow.  Agree with above.  Pt has been able to tolerate po liquids and solids with vomiting after certain foods, as has been the case for several years since she had the band placed.  She knows which foods will make her vomit.  She has been having crampy abdominal pain and diarrhea for several weeks, unrelated to her gastric band.  We attempted to empty her band but all fluid had already been removed several years ago.  Her CT scan showed residual contrast in the esophagus, so we obtained an UGI which revealed delayed transit of oral contrast.    Pt was reportedly tachycardic with possible aflutter in the ER at [facility], but was NSR when we saw her at the [facility] ER.    She was resting comfortably in the stretched in NAD.  Abd soft NT, distended, no peritoneal signs.     Labs normal    CT scan and UGI reviewed.  No slippage of band but there is delayed transit of oral contrast through it.    Recommend  admission to telemetry as per medical team  GI consult for EGD this admission if cleared by cardiology  Stool studies  Will follow

## 2022-06-09 NOTE — H&P ADULT - ATTENDING COMMENTS
The patient is seen and examined the history labs and imaging are reviewed. I agree with the resident note unless otherwise noted. worsening diarrhea over the last month with diffuse abdominal pain for two days. no sepsis on admission, denies recent travel or sick contact. no recent adjustment on her medications. check ESR. CRP and TSH. EKG with sinus tachycardia. could not find strips for aflutter in the chart. no weight loss, blood or mucus with stool. denies any vomiting or nausea. complaints of bloating and watery stool for the last month. no recent colonoscopy. s/p gastric lap band w CT showing delayed emptying. contrast study showing Significant delay in the passage of contrast through the lap band. check lactoferrin and calprotectin. advance diet as tolerated. supportive measures and IVF. consider GI eval based on the current workup or if symptoms is not improving.

## 2022-06-09 NOTE — CONSULT NOTE ADULT - SUBJECTIVE AND OBJECTIVE BOX
GENERAL SURGERY CONSULT NOTE    Patient: TALITA NUGENT , 66y ([birth year])Female   MRN: [id number]  Location: [facility] ED  Visit: 06-09-22 Emergency  Date: 06-09-22 @ 11:17    HPI:    TALITA NUEGNT is a 66y female who is s/p laparoscopic gastric band placement in 2009. She initially lost around 100 pounds but has regained about 25 pounds in last year. She denies adherence to diet and states that she mainly eats "junk food" which she tolerates with the exception of meats/rice/and macaroni. She presents to the ED c/o abdominal pain, nausea, and watery diarrhea that has been intermittent over the past week but noted severe abdominal pain yesterday. She initially presented to [facility] where she received a CT scan with PO and IV contrast which shows diffused dilation of the GI tract distal to band with contrast proximal to band. Patient denies issues with emesis, and states that she vomited either right before or right after CT scan. She last had Endoscopy/Colonoscopy around 5 yrs ago and states that she had fluid removed from band port around that time as well. Denies fevers/chills. Of note patient reportedly experienced aflutter/sinus tachycardia with PACs prior to arrival to [facility]     PAST MEDICAL & SURGICAL HISTORY:  Hypothyroidism      Foot fracture, right  x 3      GERD (gastroesophageal reflux disease)      LAP-BAND surgery status  2009      H/O abdominoplasty  2013          Home Medications:  nabumetone 500 mg oral tablet: 2 tab(s) orally once a day (09 Jun 2022 05:16)  omeprazole 40 mg oral delayed release capsule: 1 cap(s) orally once a day, As Needed (09 Jun 2022 05:16)  Synthroid 88 mcg (0.088 mg) oral tablet: 1 tab(s) orally once a day (09 Jun 2022 05:16)        VITALS:  T(F): 96.7 (06-09-22 @ 03:35), Max: 96.7 (06-09-22 @ 03:35)  HR: 69 (06-09-22 @ 10:08) (65 - 107)  BP: 133/66 (06-09-22 @ 10:08) (105/65 - 133/66)  RR: 18 (06-09-22 @ 10:08) (18 - 18)  SpO2: 100% (06-09-22 @ 10:08) (95% - 100%)    PHYSICAL EXAM:  General: NAD, AAOx3, calm and cooperative  HEENT: NCAT, LEANNE, EOMI, Trachea ML, Neck supple  Cardiac: RRR S1, S2, no Murmurs, rubs or gallops  Respiratory: CTAB, normal respiratory effort, breath sounds equal BL, no wheeze, rhonchi or crackles  Abdomen: Soft, non-distended, non-tender, no rebound, no guarding.   Musculoskeletal: Strength 5/5 BL UE/LE, ROM intact, compartments soft  Neuro: Sensation grossly intact and equal throughout, no focal deficits  Vascular: Pulses 2+ throughout, extremities well perfused  Skin: Warm/dry, normal color, no jaundice  Incision/wound: healing well, dressings in place, clean, dry and intact    MEDICATIONS  (STANDING):    MEDICATIONS  (PRN):      LAB/STUDIES:                        13.0   8.20  )-----------( 349      ( 09 Jun 2022 02:10 )             40.6     06-09    137  |  102  |  14  ----------------------------<  118<H>  3.6   |  22  |  1.1    Ca    8.8      09 Jun 2022 02:10    TPro  6.9  /  Alb  4.3  /  TBili  0.3  /  DBili  <0.2  /  AST  23  /  ALT  20  /  AlkPhos  104  06-09      LIVER FUNCTIONS - ( 09 Jun 2022 02:10 )  Alb: 4.3 g/dL / Pro: 6.9 g/dL / ALK PHOS: 104 U/L / ALT: 20 U/L / AST: 23 U/L / GGT: x             CARDIAC MARKERS ( 09 Jun 2022 02:10 )  x     / <0.01 ng/mL / x     / x     / x                  IMAGING:  < from: CT Abdomen and Pelvis w/ Oral Cont and w/ IV Cont (06.09.22 @ 05:26) >  IMPRESSION:      1.  Status post gastric lap band with oral contrast visualized within the   proximal stomach and dilated esophagus. No contrast seen distally.   Findings may suggest delayed delayed passage of oral contrast. Consider   fluoroscopic upper GI series if clinically warranted.  2.  Distal gastric distention and diffusely dilated fluid-filled loops of   small and large bowel extending to the rectum. Findings compatible with   diarrhea and possible ileus.  3.  Mild intrahepatic biliary ductal dilatation is of uncertain etiology.   This may be secondary to generalized ileus as well. Nonemergent   outpatient MRI/MRCP may be of benefit.    < end of copied text >      ACCESS DEVICES:  [x] Peripheral IV  [ ] Central Venous Line	[ ] R	[ ] L	[ ] IJ	[ ] Fem	[ ] SC	Placed:   [ ] Arterial Line		[ ] R	[ ] L	[ ] Fem	[ ] Rad	[ ] Ax	Placed:   [ ] PICC:					[ ] Mediport  [ ] Urinary Catheter, Date Placed:

## 2022-06-10 LAB
ALBUMIN SERPL ELPH-MCNC: 3.7 G/DL — SIGNIFICANT CHANGE UP (ref 3.5–5.2)
ALP SERPL-CCNC: 71 U/L — SIGNIFICANT CHANGE UP (ref 30–115)
ALT FLD-CCNC: 14 U/L — SIGNIFICANT CHANGE UP (ref 0–41)
ANION GAP SERPL CALC-SCNC: 9 MMOL/L — SIGNIFICANT CHANGE UP (ref 7–14)
AST SERPL-CCNC: 15 U/L — SIGNIFICANT CHANGE UP (ref 0–41)
BASOPHILS # BLD AUTO: 0.04 K/UL — SIGNIFICANT CHANGE UP (ref 0–0.2)
BASOPHILS NFR BLD AUTO: 0.4 % — SIGNIFICANT CHANGE UP (ref 0–1)
BILIRUB SERPL-MCNC: 0.3 MG/DL — SIGNIFICANT CHANGE UP (ref 0.2–1.2)
BUN SERPL-MCNC: 6 MG/DL — LOW (ref 10–20)
CALCIUM SERPL-MCNC: 8.9 MG/DL — SIGNIFICANT CHANGE UP (ref 8.5–10.1)
CHLORIDE SERPL-SCNC: 107 MMOL/L — SIGNIFICANT CHANGE UP (ref 98–110)
CO2 SERPL-SCNC: 26 MMOL/L — SIGNIFICANT CHANGE UP (ref 17–32)
CREAT SERPL-MCNC: 0.9 MG/DL — SIGNIFICANT CHANGE UP (ref 0.7–1.5)
CRP SERPL-MCNC: 13.7 MG/L — HIGH
EGFR: 71 ML/MIN/1.73M2 — SIGNIFICANT CHANGE UP
EOSINOPHIL # BLD AUTO: 0.15 K/UL — SIGNIFICANT CHANGE UP (ref 0–0.7)
EOSINOPHIL NFR BLD AUTO: 1.6 % — SIGNIFICANT CHANGE UP (ref 0–8)
ERYTHROCYTE [SEDIMENTATION RATE] IN BLOOD: 3 MM/HR — SIGNIFICANT CHANGE UP (ref 0–20)
ERYTHROCYTE [SEDIMENTATION RATE] IN BLOOD: 3 MM/HR — SIGNIFICANT CHANGE UP (ref 0–20)
GLUCOSE SERPL-MCNC: 82 MG/DL — SIGNIFICANT CHANGE UP (ref 70–99)
HCT VFR BLD CALC: 35.7 % — LOW (ref 37–47)
HCV AB S/CO SERPL IA: 0.04 COI — SIGNIFICANT CHANGE UP
HCV AB SERPL-IMP: SIGNIFICANT CHANGE UP
HGB BLD-MCNC: 11.7 G/DL — LOW (ref 12–16)
IMM GRANULOCYTES NFR BLD AUTO: 0.3 % — SIGNIFICANT CHANGE UP (ref 0.1–0.3)
LYMPHOCYTES # BLD AUTO: 1.54 K/UL — SIGNIFICANT CHANGE UP (ref 1.2–3.4)
LYMPHOCYTES # BLD AUTO: 16 % — LOW (ref 20.5–51.1)
MAGNESIUM SERPL-MCNC: 1.8 MG/DL — SIGNIFICANT CHANGE UP (ref 1.8–2.4)
MCHC RBC-ENTMCNC: 30 PG — SIGNIFICANT CHANGE UP (ref 27–31)
MCHC RBC-ENTMCNC: 32.8 G/DL — SIGNIFICANT CHANGE UP (ref 32–37)
MCV RBC AUTO: 91.5 FL — SIGNIFICANT CHANGE UP (ref 81–99)
MONOCYTES # BLD AUTO: 0.52 K/UL — SIGNIFICANT CHANGE UP (ref 0.1–0.6)
MONOCYTES NFR BLD AUTO: 5.4 % — SIGNIFICANT CHANGE UP (ref 1.7–9.3)
NEUTROPHILS # BLD AUTO: 7.37 K/UL — HIGH (ref 1.4–6.5)
NEUTROPHILS NFR BLD AUTO: 76.3 % — HIGH (ref 42.2–75.2)
NRBC # BLD: 0 /100 WBCS — SIGNIFICANT CHANGE UP (ref 0–0)
PLATELET # BLD AUTO: 315 K/UL — SIGNIFICANT CHANGE UP (ref 130–400)
POTASSIUM SERPL-MCNC: 4.1 MMOL/L — SIGNIFICANT CHANGE UP (ref 3.5–5)
POTASSIUM SERPL-SCNC: 4.1 MMOL/L — SIGNIFICANT CHANGE UP (ref 3.5–5)
PROCALCITONIN SERPL-MCNC: 0.07 NG/ML — SIGNIFICANT CHANGE UP (ref 0.02–0.1)
PROT SERPL-MCNC: 5.8 G/DL — LOW (ref 6–8)
RBC # BLD: 3.9 M/UL — LOW (ref 4.2–5.4)
RBC # FLD: 13.9 % — SIGNIFICANT CHANGE UP (ref 11.5–14.5)
SODIUM SERPL-SCNC: 142 MMOL/L — SIGNIFICANT CHANGE UP (ref 135–146)
T4 FREE SERPL-MCNC: 0.9 NG/DL — SIGNIFICANT CHANGE UP (ref 0.9–1.8)
T4 FREE+ TSH PNL SERPL: 4.94 UIU/ML — HIGH (ref 0.27–4.2)
WBC # BLD: 9.65 K/UL — SIGNIFICANT CHANGE UP (ref 4.8–10.8)
WBC # FLD AUTO: 9.65 K/UL — SIGNIFICANT CHANGE UP (ref 4.8–10.8)

## 2022-06-10 PROCEDURE — 99232 SBSQ HOSP IP/OBS MODERATE 35: CPT

## 2022-06-10 PROCEDURE — 99223 1ST HOSP IP/OBS HIGH 75: CPT

## 2022-06-10 PROCEDURE — 99233 SBSQ HOSP IP/OBS HIGH 50: CPT

## 2022-06-10 PROCEDURE — 93010 ELECTROCARDIOGRAM REPORT: CPT

## 2022-06-10 RX ORDER — HEPARIN SODIUM 5000 [USP'U]/ML
5000 INJECTION INTRAVENOUS; SUBCUTANEOUS EVERY 8 HOURS
Refills: 0 | Status: DISCONTINUED | OUTPATIENT
Start: 2022-06-10 | End: 2022-06-11

## 2022-06-10 RX ADMIN — Medication 650 MILLIGRAM(S): at 17:53

## 2022-06-10 RX ADMIN — Medication 88 MICROGRAM(S): at 06:52

## 2022-06-10 RX ADMIN — Medication 650 MILLIGRAM(S): at 16:56

## 2022-06-10 NOTE — CONSULT NOTE ADULT - SUBJECTIVE AND OBJECTIVE BOX
Location: [facility] ER Hold 05 A ([facility] ER Hold)  Patient Name: TALITA LINDSEY  Age: 66y  Gender: Female    Gastroenterology Consult Note    Ms Lindsey is a 66 year old female patient with history of   - Hypothyroidism   - GERD. Home med omeprazole 20mg PRN  - s/p Lap band surgery in 2009 by Dr Mcduffie s/p port removal  - s/p Abdominoplasty in 2013      She presented to ED on 06/09 for evaluation of diarrhea.  History goes back to 1.5months PTP when she started reporting on-off episodes of watery non bloody yellowish non mucoid diarrhea occurring once daily for a week every other week.  This has been associated with intermittent episodes of abdominal gas/distention that would improve with defecation.  Over the last 2 days PTP, patient has been complaining of severe epigastric + LLQ pain that is stabbing in nature, non radiating and not exacerbated with meals but partially relieved with defecation.  This has been associated with reduced PO intake and weight gain of 32lb in 2.5 years in the absence of nausea, vomiting, fevers, or chills.    On ROS, she reports years of globus sensation (Stable/ not progressively worsening) to some solid foods like rice, macaroni, and steak with intermittent episodes of regurgitation of food pieces.  She denies any recent night sweats, URTI symptoms (cough, rhinorrhea, sore throat), urinary symptoms (urinary frequency, urgency, intermittence, dysuria, foul smelling urine, cloudy urine), or headache.   No sick contacts.  No recent travel or exposure to recent travelers.      Upon presentation to the ED, the patient was hemodynamically stable  No sepsis in ED  - Labs: ED WBC 8.2, Hb 11.7, Plt 349, LA 0.9, ESR 3, CRP 13.7   - Microbiology: Clostridium difficile Toxin by PCR (06.09.22 @ 08:00)    C Diff by PCR Result: Negative  - Imaging:  --> CT Abdomen and Pelvis w/ Oral Cont and w/ IV Cont (06.09.22 @ 05:26) Status post gastric lap band with oral contrast visualized within the proximal stomach and dilated esophagus. No contrast seen distally. Findings may suggest delayed delayed passage of oral contrast. Consider fluoroscopic upper GI series if clinically warranted. Distal gastric distention and diffusely dilated fluid-filled loops of small and large bowel extending to the rectum. Findings compatible with diarrhea and possible ileus.  --> Xray UGI Single Contrast Study (06.09.22 @ 14:01) There is a esophageal dysmotility with numerous tertiary contractions involving the mid and distal esophagus with to and fro motion of the contrast also adding to the delay in the passage of contrast into the  stomach. The lap band Appears to be in normal alignment. There is no extravasation of contrast.    - Patient was evaluated by Bariatric surgery team   - We were consulted in setting of symptoms and findings of esophageal dilation/3ry contractions along esophagus      Vital Signs in the last 24 hours   Vitals Summary T(C): 36.9 (06-10-22 @ 16:11), Max: 36.9 (06-10-22 @ 09:13)  HR: 59 (06-10-22 @ 16:11) (54 - 61)  BP: 116/57 (06-10-22 @ 16:11) (102/63 - 120/56)  RR: 18 (06-10-22 @ 16:11) (18 - 18)  SpO2: 96% (06-10-22 @ 16:11) (96% - 100%)  Vent Data   Intake/ Output       Physical Exam  * General Appearance: Alert, cooperative, interactive, well-groomed, oriented to time, place, and person, in no acute distress  * Head: Normocephalic, without obvious abnormality, atraumatic  * Eyes: PERRL, conjunctiva/corneas clear, EOM's intact, fundi benign, both eyesl  * Neck: Supple, symmetrical, trachea midline, no adenopathy;   * Lungs: Respirations unlabored, Good bilateral air entry, normal breath sounds (Clear to auscultation bilaterally, no audible wheezes, crackles, or rhonchi)  * Heart: Regular Rate and Rhythm, normal S1 and S2, no audible murmur, rub, or gallop  * Abdomen: Symmetric, non-distended, no scar, soft, non-tender, bowel sounds active all four quadrants, no masses, no organomegaly (no hepatosplenomegaly)  * Extremities: Extremities normal, atraumatic, no cyanosis, no lower extremity pitting edema bilaterally, adequate dorsalis pedis pulses  * Pulses: 2+ and symmetric all extremities  * Skin: Skin color, texture, turgor normal, no rashes or lesions  * Lymph nodes: Cervical, supraclavicular, and axillary nodes normal  * Neurologic: CNII-XII intact, normal strength, sensation and reflexes  throughout      Investigations   Laboratory Workup  - CBC:                        11.7   9.65  )-----------( 315      ( 10 Ariel 2022 07:21 )             35.7     - Chemistry:  06-10    142  |  107  |  6<L>  ----------------------------<  82  4.1   |  26  |  0.9    Ca    8.9      10 Ariel 2022 07:21  Mg     1.8     06-10    TPro  5.8<L>  /  Alb  3.7  /  TBili  0.3  /  DBili  x   /  AST  15  /  ALT  14  /  AlkPhos  71  06-10          Current Medications  Standing Medications  heparin   Injectable 5000 Unit(s) SubCutaneous every 8 hours  levothyroxine 88 MICROGram(s) Oral daily  pantoprazole    Tablet 40 milliGRAM(s) Oral before breakfast    PRN Medications  acetaminophen     Tablet .. 650 milliGRAM(s) Oral every 6 hours PRN Temp greater or equal to 38C (100.4F), Moderate Pain (4 - 6), Severe Pain (7 - 10)    Singles Doses Administered  (ADM OVERRIDE) 1 each &lt;see task&gt; GiveOnce  (ADM OVERRIDE) 1 each &lt;see task&gt; GiveOnce  (ADM OVERRIDE) 1 each &lt;see task&gt; GiveOnce  (ADM OVERRIDE) 1 each &lt;see task&gt; GiveOnce  (ADM OVERRIDE) 1 each &lt;see task&gt; GiveOnce  (ADM OVERRIDE) 1 each &lt;see task&gt; GiveOnce  (ADM OVERRIDE) 1 each &lt;see task&gt; GiveOnce  (ADM OVERRIDE) 1 each &lt;see task&gt; GiveOnce  (Floorstock) 2 each &lt;see task&gt; GiveOnce  (Floorstock) 2 each &lt;see task&gt; GiveOnce  diatrizoate meglumine/diatrizoate sodium. 30 milliLiter(s) Oral once  famotidine Injectable 20 milliGRAM(s) IV Push once  lactated ringers Bolus 1000 milliLiter(s) IV Bolus once  lactated ringers Bolus 1000 milliLiter(s) IV Bolus once  morphine  - Injectable 4 milliGRAM(s) IV Push Once  morphine  - Injectable 4 milliGRAM(s) IV Push Once  morphine  - Injectable 4 milliGRAM(s) IV Push once  morphine  - Injectable 4 milliGRAM(s) IV Push Once  ondansetron Injectable 4 milliGRAM(s) IV Push once  ondansetron Injectable 4 milliGRAM(s) IV Push once  ondansetron Injectable 4 milliGRAM(s) IV Push once  ondansetron Injectable 4 milliGRAM(s) IV Push once  sodium chloride 0.9% Bolus 1000 milliLiter(s) IV Bolus once

## 2022-06-10 NOTE — PROGRESS NOTE ADULT - TIME BILLING
66F PMHx hypothyroidism, s/p gastric lap band here with abd pain, diarrhea.    #Abd pain  began two days prior; with ongoing diarrhea for past two mos  ct abd with possible ileus, diarrhea  xray, ct with delayed transit through esophagus  no intervention per sx  check stool cx, ova and p  gi pcr  fecal calprotectin, tissue transglutaminase, endomysial ab  gi eval  #Hypothyroidism  synthroid 88  #s/p Gastric lap band  outpt sx f/u  #DVT ppx  subq hep    #Progress Note Handoff:  Pending (specify):  Consults_________, Tests________, Test Results_______, Other___f/u gi______  Family discussion: d/w pt at bedside re: treatment plan, primary dx  Disposition: Home___/SNF___/Other________/Unknown at this time_____x___

## 2022-06-10 NOTE — CONSULT NOTE ADULT - ASSESSMENT
Assessment and Plan  Case of a 66 year old female patient with history of Hypothyroidism and GERD s/p Lap band surgery around 12 years ago s/p port removal who presented to ED on 06/09 for evaluation of 2 days of severe epigastric + LLQ pain associated with 1.5months history of on-off watery non bloody yellowish diarrhea, found to have evidence of dilated esophagus and esophageal motility with 3ry contractions on imaging. We are consulted for 3ry esophageal contractions and watery diarrhea.       Intermittent Diarrhea and Abdominal Distention/Discomfort  Possibly Irritable Bowel Syndrome- Diarrhea Predominant  * 1.5 months history of Watery Non Bloody Non Mucoid Diarrhea occurring once daily for a week every other week; abdominal distention and discomfort that improves with defecation  * No sepsis in ED; ED WBC 8.2, Hb 11.7, Plt 349, LA 0.9, ESR 3, CRP 13.7   * Clostridium difficile Toxin by PCR (06.09.22 @ 08:00)    C Diff by PCR Result: Negative  * CT Abdomen and Pelvis w/ Oral Cont and w/ IV Cont (06.09.22 @ 05:26) as detailed below    - Will schedule patient for non emergent outpatient Colonoscopy and biopsies (Earliest next week versus in 2 weeks) by Dr Sharon Cyr  - No need for further in-patient workup  - In setting of yellow-grey loose stools, would recommend discharging patient on Cholestyramine 1g with meals       Esophageal Dilation in Setting of History of Lap Band Surgery: Rule Out Stricture  Esophageal Dysmotility with Numerous Tertiary Contractions  * CT Abdomen and Pelvis w/ Oral Cont and w/ IV Cont (06.09.22 @ 05:26) Status post gastric lap band with oral contrast visualized within the proximal stomach and dilated esophagus. No contrast seen distally. Findings may suggest delayed delayed passage of oral contrast. Consider fluoroscopic upper GI series if clinically warranted. Distal gastric distention and diffusely dilated fluid-filled loops of small and large bowel extending to the rectum. Findings compatible with diarrhea and possible ileus.  * Xray UGI Single Contrast Study (06.09.22 @ 14:01) There is a esophageal dysmotility with numerous tertiary contractions involving the mid and distal esophagus with to and fro motion of the contrast also adding to the delay in the passage of contrast into the  stomach. The lap band Appears to be in normal alignment. There is no extravasation of contrast.    - Will schedule patient for non emergent outpatient EGD with possible esophageal dilation (Earliest next week versus in 2 weeks) by Dr Sharon Cyr  - Would recommend resuming Omeprazole 20mg daily on discharge   - Outpatient follow up with Bariatric surgeon      Mild Intrahepatic Dilation- Likely in Setting of Generalized Ileus  * Comprehensive Metabolic Panel (06.10.22 @ 07:21)    Bilirubin Total, Serum: 0.3 mg/dL    Alkaline Phosphatase, Serum: 71 U/L    Aspartate Aminotransferase (AST/SGOT): 15 U/L    Alanine Aminotransferase (ALT/SGPT): 14 U/L  * CT Abdomen and Pelvis w/ Oral Cont and w/ IV Cont (06.09.22 @ 05:26) Mild intrahepatic biliary ductal dilatation is of uncertain etiology. This may be secondary to generalized ileus as well. Nonemergent outpatient MRI/MRCP may be of benefit.    - Outpatient follow up with GI at [facility] for non emergent MR/MRCP      Thank you for your consult.  Plan discussed with Dr. Sharon Cyr.  Patient is cleared for discharge from GI stand point.

## 2022-06-10 NOTE — CONSULT NOTE ADULT - ATTENDING COMMENTS
SP lap band admitted for IBS-D symptoms in addition to dysphagia. Imaging showed a dilated esophagus. Will schedule EGD and colonosocpy on outpatient basis. May DC (card given)

## 2022-06-10 NOTE — CONSULT NOTE ADULT - CONSULT REASON
3ry Esophageal Contractions   Intermittent Episodes of Watery Diarrhea
Hx of Gastric Band
Possible ileus vs diarrhea

## 2022-06-10 NOTE — PROGRESS NOTE ADULT - ATTENDING COMMENTS
Pt. seen and examined this am with surgery fellow, agree with above.  She is tolerating po liquids, her abd distension is improved, no abd pain since yesterday.     AVSS  A&OX3, NAD      Labs reviewed    Plan:  F/u GI  F/u cardiology  Okay to advance diet per bariatric surgery as tolerated if no procedures planned  Will follow

## 2022-06-11 ENCOUNTER — TRANSCRIPTION ENCOUNTER (OUTPATIENT)
Age: 67
End: 2022-06-11

## 2022-06-11 VITALS
OXYGEN SATURATION: 98 % | TEMPERATURE: 96 F | DIASTOLIC BLOOD PRESSURE: 55 MMHG | RESPIRATION RATE: 18 BRPM | HEART RATE: 52 BPM | SYSTOLIC BLOOD PRESSURE: 107 MMHG

## 2022-06-11 PROCEDURE — 99239 HOSP IP/OBS DSCHRG MGMT >30: CPT

## 2022-06-11 RX ORDER — CHOLESTYRAMINE 4 G/9G
1 POWDER, FOR SUSPENSION ORAL
Qty: 90 | Refills: 0
Start: 2022-06-11 | End: 2022-07-10

## 2022-06-11 RX ORDER — ACETAMINOPHEN 500 MG
2 TABLET ORAL
Qty: 0 | Refills: 0 | DISCHARGE
Start: 2022-06-11

## 2022-06-11 RX ORDER — CHOLESTYRAMINE 4 G/9G
4 POWDER, FOR SUSPENSION ORAL
Qty: 360 | Refills: 0
Start: 2022-06-11 | End: 2022-07-10

## 2022-06-11 RX ADMIN — PANTOPRAZOLE SODIUM 40 MILLIGRAM(S): 20 TABLET, DELAYED RELEASE ORAL at 05:40

## 2022-06-11 RX ADMIN — HEPARIN SODIUM 5000 UNIT(S): 5000 INJECTION INTRAVENOUS; SUBCUTANEOUS at 05:40

## 2022-06-11 RX ADMIN — Medication 88 MICROGRAM(S): at 05:40

## 2022-06-11 NOTE — DISCHARGE NOTE PROVIDER - NSDCMRMEDTOKEN_GEN_ALL_CORE_FT
acetaminophen 325 mg oral tablet: 2 tab(s) orally every 6 hours, As needed, Temp greater or equal to 38C (100.4F), Moderate Pain (4 - 6), Severe Pain (7 - 10)  cholestyramine 4 g/4.8 g oral powder for reconstitution: 1 gram(s) orally 3 times a day WITH MEALS ONLY   omeprazole 40 mg oral delayed release capsule: 1 cap(s) orally once a day, As Needed  Synthroid 88 mcg (0.088 mg) oral tablet: 1 tab(s) orally once a day  
No

## 2022-06-11 NOTE — DISCHARGE NOTE NURSING/CASE MANAGEMENT/SOCIAL WORK - NSDCPEFALRISK_GEN_ALL_CORE
For information on Fall & Injury Prevention, visit: [email or web address] OR  [email or web address] OR  https://www.cdc.gov/steadi/patient.html

## 2022-06-11 NOTE — DISCHARGE NOTE PROVIDER - ATTENDING DISCHARGE PHYSICAL EXAMINATION:
Vital Signs Last 24 Hrs  T(C): 35.7 (11 Jun 2022 05:10), Max: 36.9 (10 Ariel 2022 16:11)  T(F): 96.2 (11 Jun 2022 05:10), Max: 98.4 (10 Ariel 2022 16:11)  HR: 52 (11 Jun 2022 05:10) (52 - 59)  BP: 107/55 (11 Jun 2022 05:10) (105/51 - 120/60)  BP(mean): 68 (11 Jun 2022 02:30) (68 - 68)  RR: 18 (11 Jun 2022 05:10) (18 - 18)  SpO2: 98% (11 Jun 2022 05:10) (96% - 98%)    GEN: NAD  CV: NL S1/2   RESP: CTA B/L  GI: +BS, Soft, Mild tender, no rebound   Ext: No e/c/c   MS: AOx3                           11.7   9.65  )-----------( 315      ( 10 Ariel 2022 07:21 )             35.7   06-10    142  |  107  |  6<L>  ----------------------------<  82  4.1   |  26  |  0.9    Ca    8.9      10 Ariel 2022 07:21  Mg     1.8     06-10    TPro  5.8<L>  /  Alb  3.7  /  TBili  0.3  /  DBili  x   /  AST  15  /  ALT  14  /  AlkPhos  71  06-10

## 2022-06-11 NOTE — DISCHARGE NOTE PROVIDER - HOSPITAL COURSE
66F PMHx hypothyroidism, s/p gastric lap band here with abd pain x2d, diarrhea x 1month.     #Abd pain  began two days prior; with ongoing diarrhea for past two mos  ct abd with possible ileus, diarrhea  xray, ct with delayed transit through esophagus  no intervention per sx  check stool cx, ova and parasites  cdiff pcr   gi pcr  fecal calprotectin, fecal lactoferrin, tissue transglutaminase, endomysial ab  gi eval notes reviewed f/u in clinic and start cholestyramine 3x a day w/ meals     #Hypothyroidism  synthroid 88    #s/p Gastric lap band  outpt sx f/u    CT A/P   1.  Status post gastric lap band with oral contrast visualized within the   proximal stomach and dilated esophagus. No contrast seen distally.   Findings may suggest delayed delayed passage of oral contrast. Consider   fluoroscopic upper GI series if clinically warranted.  2.  Distal gastric distention and diffusely dilated fluid-filled loops of   small and large bowel extending to the rectum. Findings compatible with   diarrhea and possible ileus.  3.  Mild intrahepatic biliary ductal dilatation is of uncertain etiology.   This may be secondary to generalized ileus as well. Nonemergent   outpatient MRI/MRCP may be of benefit.

## 2022-06-11 NOTE — PROGRESS NOTE ADULT - SUBJECTIVE AND OBJECTIVE BOX
Surgery Progress Note       Patient: TALITA NUGENT , 66y ([birth year])Female   MRN: [id number]  Location: [facility] ER Hold 051 A  Visit: 06-09-22 Inpatient  Date: 06-10-22 @ 09:16        Admitted :06-09-22 (1d)  LOS: 1d    Procedure/Dx/Injuries: abdominal pain and diarrhea with hx of gastric band    Events of past 24 hours: Abdominal pain greatly improved, no longer requiring IV pain meds. After UGI yesterday- showed slow transit but passed through band. No vomiting of contrast. No vomiting since study, tolerated water without issues. Prior to admission she was eating solid food without issue (except cannot eat meat or pasta/rice due to vomiting). She had chicken salad with crackers as the most recent meal prior to admission.     Her main complaint is loose/watery bms with abdominal pain, not associated with nausea and vomiting. No fluid in band yesterday, she described it as being previously removed >5 years ago. She also was documented to have a syncopal episode with aflutter- she states that she does "pass out" when she is scared frequently but denies cardiac history.       Vitals:   T(F): 98.5 (06-10-22 @ 09:13), Max: 98.5 (06-10-22 @ 09:13)  HR: 61 (06-10-22 @ 09:13)  BP: 120/56 (06-10-22 @ 09:13)  RR: 18 (06-10-22 @ 09:13)  SpO2: 98% (06-10-22 @ 09:13)      Diet, Clear Liquid      Fluids:     I & O's:        PHYSICAL EXAM:  General Appearance: NAD  HEENT: Normocephalic  Heart: reg rate  Lungs: No increased work of breathing or accessory muscle use.  Abdomen:  Soft, nontender, much less distended today  MSK/Extremities: Warm & well-perfused.   Skin: Warm, dry. No jaundice.   Incision/wound: healing well, clean, dry and intact    MEDICATIONS  (STANDING):  levothyroxine 88 MICROGram(s) Oral daily  pantoprazole    Tablet 40 milliGRAM(s) Oral before breakfast    MEDICATIONS  (PRN):  acetaminophen     Tablet .. 650 milliGRAM(s) Oral every 6 hours PRN Temp greater or equal to 38C (100.4F), Moderate Pain (4 - 6), Severe Pain (7 - 10)      DVT PROPHYLAXIS:   GI PROPHYLAXIS: pantoprazole    Tablet 40 milliGRAM(s) Oral before breakfast    ANTICOAGULATION:   ANTIBIOTICS:            LAB/STUDIES:  Labs:  CAPILLARY BLOOD GLUCOSE                              11.7   9.65  )-----------( 315      ( 10 Ariel 2022 07:21 )             35.7       Auto Neutrophil %: 76.3 % (06-10-22 @ 07:21)  Auto Immature Granulocyte %: 0.3 % (06-10-22 @ 07:21)    06-10    142  |  107  |  6<L>  ----------------------------<  82  4.1   |  26  |  0.9      Calcium, Total Serum: 8.9 mg/dL (06-10-22 @ 07:21)      LFTs:             5.8  | 0.3  | 15       ------------------[71      ( 10 Ariel 2022 07:21 )  3.7  | x    | 14          Lipase:x      Amylase:x         Lactate, Blood: 0.9 mmol/L (06-09-22 @ 02:10)      Coags:    CARDIAC MARKERS ( 09 Jun 2022 02:10 )  x     / <0.01 ng/mL / x     / x     / x                          IMAGING: UGI above        
INTERVAL HPI/OVERNIGHT EVENTS:    SUBJECTIVE: Patient seen and examined at bedside.     no cp, sob, fever  abd pain, +diarrhea, no nausea, vomiting    OBJECTIVE:    VITAL SIGNS:  Vital Signs Last 24 Hrs  T(C): 36.9 (10 Ariel 2022 16:11), Max: 36.9 (10 Ariel 2022 09:13)  T(F): 98.4 (10 Ariel 2022 16:11), Max: 98.5 (10 Ariel 2022 09:13)  HR: 59 (10 Ariel 2022 16:11) (54 - 61)  BP: 116/57 (10 Ariel 2022 16:11) (102/63 - 120/56)  BP(mean): --  RR: 18 (10 Ariel 2022 16:11) (18 - 18)  SpO2: 96% (10 Ariel 2022 16:11) (96% - 100%)      PHYSICAL EXAM:    General: NAD  HEENT: NC/AT; PERRL, clear conjunctiva  Neck: supple  Respiratory: CTA b/l  Cardiovascular: +S1/S2; RRR  Abdomen: soft, NT/ND; +BS x4  Extremities: WWP, 2+ peripheral pulses b/l; no LE edema  Skin: normal color and turgor; no rash  Neurological:    MEDICATIONS:  MEDICATIONS  (STANDING):  heparin   Injectable 5000 Unit(s) SubCutaneous every 8 hours  levothyroxine 88 MICROGram(s) Oral daily  pantoprazole    Tablet 40 milliGRAM(s) Oral before breakfast    MEDICATIONS  (PRN):  acetaminophen     Tablet .. 650 milliGRAM(s) Oral every 6 hours PRN Temp greater or equal to 38C (100.4F), Moderate Pain (4 - 6), Severe Pain (7 - 10)      ALLERGIES:  Allergies    No Known Allergies    Intolerances        LABS:                        11.7   9.65  )-----------( 315      ( 10 Ariel 2022 07:21 )             35.7     Hemoglobin: 11.7 g/dL (06-10 @ 07:21)  Hemoglobin: 13.0 g/dL (06-09 @ 02:10)    CBC Full  -  ( 10 Ariel 2022 07:21 )  WBC Count : 9.65 K/uL  RBC Count : 3.90 M/uL  Hemoglobin : 11.7 g/dL  Hematocrit : 35.7 %  Platelet Count - Automated : 315 K/uL  Mean Cell Volume : 91.5 fL  Mean Cell Hemoglobin : 30.0 pg  Mean Cell Hemoglobin Concentration : 32.8 g/dL  Auto Neutrophil # : 7.37 K/uL  Auto Lymphocyte # : 1.54 K/uL  Auto Monocyte # : 0.52 K/uL  Auto Eosinophil # : 0.15 K/uL  Auto Basophil # : 0.04 K/uL  Auto Neutrophil % : 76.3 %  Auto Lymphocyte % : 16.0 %  Auto Monocyte % : 5.4 %  Auto Eosinophil % : 1.6 %  Auto Basophil % : 0.4 %    06-10    142  |  107  |  6<L>  ----------------------------<  82  4.1   |  26  |  0.9    Ca    8.9      10 Ariel 2022 07:21  Mg     1.8     06-10    TPro  5.8<L>  /  Alb  3.7  /  TBili  0.3  /  DBili  x   /  AST  15  /  ALT  14  /  AlkPhos  71  06-10    Creatinine Trend: 0.9<--, 1.1<--  LIVER FUNCTIONS - ( 10 Ariel 2022 07:21 )  Alb: 3.7 g/dL / Pro: 5.8 g/dL / ALK PHOS: 71 U/L / ALT: 14 U/L / AST: 15 U/L / GGT: x               hs Troponin:              CSF:                      EKG:   MICROBIOLOGY:    IMAGING:      Labs, imaging, EKG personally reviewed    RADIOLOGY & ADDITIONAL TESTS: Reviewed.
GENERAL SURGERY PROGRESS NOTE    Patient: TALITA NUGENT , 66y ([birth year])Female   MRN: [id number]  Location: [address]  Visit: 06-09-22 Inpatient  Date: 06-11-22 @ 03:41    Hospital Day #: 3  Post-Op Day #: None    Procedure/Dx/Injuries: 66F w/ h/o hypothyroidism, GERD, s/p gastric band presents with abdominal pain non-bloody, watery diarrhea.    Events of past 24 hours: Patient seen and evaluated at bedside, HD stable    PAST MEDICAL & SURGICAL HISTORY:  Hypothyroidism      Foot fracture, right  x 3      GERD (gastroesophageal reflux disease)      LAP-BAND surgery status  2009      H/O abdominoplasty  2013          Vitals:   T(F): 96.6 (06-11-22 @ 02:30), Max: 98.5 (06-10-22 @ 09:13)  HR: 56 (06-11-22 @ 02:30)  BP: 105/51 (06-11-22 @ 02:30)  RR: 18 (06-11-22 @ 02:30)  SpO2: 97% (06-10-22 @ 23:35)      Diet, Regular      Fluids:     I & O's:    Bowel Movement : [x] YES [] NO  Flatus : [x] YES [] NO    PHYSICAL EXAM:  General Appearance: NAD  HEENT: Normocephalic  Heart: reg rate  Lungs: No increased work of breathing or accessory muscle use.  Abdomen:  Soft, nontender, much less distended today  MSK/Extremities: Warm & well-perfused.   Skin: Warm, dry. No jaundice.   Incision/wound: healing well, clean, dry and intact      MEDICATIONS  (STANDING):  heparin   Injectable 5000 Unit(s) SubCutaneous every 8 hours  levothyroxine 88 MICROGram(s) Oral daily  pantoprazole    Tablet 40 milliGRAM(s) Oral before breakfast    MEDICATIONS  (PRN):  acetaminophen     Tablet .. 650 milliGRAM(s) Oral every 6 hours PRN Temp greater or equal to 38C (100.4F), Moderate Pain (4 - 6), Severe Pain (7 - 10)      DVT PROPHYLAXIS: heparin   Injectable 5000 Unit(s) SubCutaneous every 8 hours    GI PROPHYLAXIS: pantoprazole    Tablet 40 milliGRAM(s) Oral before breakfast    ANTICOAGULATION:   ANTIBIOTICS:            LAB/STUDIES:  Labs:  CAPILLARY BLOOD GLUCOSE                              11.7   9.65  )-----------( 315      ( 10 Ariel 2022 07:21 )             35.7       Auto Neutrophil %: 76.3 % (06-10-22 @ 07:21)  Auto Immature Granulocyte %: 0.3 % (06-10-22 @ 07:21)    06-10    142  |  107  |  6<L>  ----------------------------<  82  4.1   |  26  |  0.9      Calcium, Total Serum: 8.9 mg/dL (06-10-22 @ 07:21)      LFTs:             5.8  | 0.3  | 15       ------------------[71      ( 10 Ariel 2022 07:21 )  3.7  | x    | 14          Lipase:x      Amylase:x         Lactate, Blood: 0.9 mmol/L (06-09-22 @ 02:10)      ACCESS/ DEVICES:  [x] Peripheral IV

## 2022-06-11 NOTE — DISCHARGE NOTE PROVIDER - NSDCCPCAREPLAN_GEN_ALL_CORE_FT
PRINCIPAL DISCHARGE DIAGNOSIS  Diagnosis: Intractable abdominal pain  Assessment and Plan of Treatment: CHRONIC DIARRHEA WITH ACUTE ABDOMINAL PAIN   - UNCLEAR ETYOLOGY AT THIS TIME  INTRAHEPATIC BILIARY DIALATION  - FOLLOW WITH GI FOR ERCP AND FURTHER EVALUATION IN CLINIC  - IF YOU DEVELOP FEVERS OR FURTHER ABDOMINAL PAIN COME BACK TO THE ER  - TAKE CHOLESTYREMINE AS PRESCRIBED WITH MEALS   - YOU NEED TO FOLLOW WITH GI FOR FURTHER STOOL ANALYSIS AS YOU WERE NOTE ABLE TO HAVE STOOL SAMPLE TO SEND STUDIES IN THE HOSPITAL         SECONDARY DISCHARGE DIAGNOSES  Diagnosis: Diarrhea  Assessment and Plan of Treatment: CHRONIC UNCLEAR ETIOLOGY   YOU WILL NEED COLONOSCOPY AND FURTRHER TESTING WITH GI CLINIC    Diagnosis: Ileus  Assessment and Plan of Treatment:     Diagnosis: New onset atrial flutter  Assessment and Plan of Treatment:     Diagnosis: Syncope  Assessment and Plan of Treatment:

## 2022-06-11 NOTE — PROGRESS NOTE ADULT - ASSESSMENT
ASSESSMENT:  66F w/ h/o hypothyroidism, GERD, s/p gastric band presents with abdominal pain non-bloody, watery diarrhea.    PLAN:  - Management per primary team  - GI to do colonoscopy/EGD outpatient  - No surgical intervention   - Pain Management  - Strict Ins and Out  - K>4, MG>2, Phos>3    Lines/Tubes: PAXTON    BLUE TEAM SPECTRA: [id number]
A/P:   66y F hx of gastric band placed 12 years ago who presented with pan-distension of her bowel from past the band to her colon/pain and loose bms, does not appear to be associated with the band   Clinically the pt is tolerating liquids and solids with occasional emesis after pasta/rice or meat  -Ok for a soft diet if no planned procedures from our stand point  -GI consult called for evaluation and possible egd this admission +/- cscope  -Cardiac issues per primary team   -DVT ppx per primary team, ok from our standpoint   -Seen with Dr Oropeza this morning     Mary Dhillon MD  [facility] Fellow    [place] TEAM SPECTRA [id number]

## 2022-06-11 NOTE — DISCHARGE NOTE NURSING/CASE MANAGEMENT/SOCIAL WORK - PATIENT PORTAL LINK FT
You can access the FollowMyHealth Patient Portal offered by [facility] by registering at the following website: [email or web address]. By joining [employer]’s FollowMyHealth portal, you will also be able to view your health information using other applications (apps) compatible with our system.

## 2022-06-11 NOTE — DISCHARGE NOTE PROVIDER - CARE PROVIDER_API CALL
Laurence Bell)  Surgery  [address] [place] [address]  Phone: [phone]  Fax: [phone]  Follow Up Time:     Sharon Green)  Gastroenterology; Internal Medicine  [address] [place] [address]  Phone: [phone]  Fax: [phone]  Follow Up Time:

## 2022-06-12 LAB
CULTURE RESULTS: SIGNIFICANT CHANGE UP
ENDOMYSIUM IGA TITR SER IF: NEGATIVE — SIGNIFICANT CHANGE UP
ENDOMYSIUM IGA TITR SER: SIGNIFICANT CHANGE UP
SPECIMEN SOURCE: SIGNIFICANT CHANGE UP

## 2022-06-13 LAB
CULTURE RESULTS: SIGNIFICANT CHANGE UP
SPECIMEN SOURCE: SIGNIFICANT CHANGE UP

## 2022-06-15 LAB
LACTOFERRIN STL-MCNC: 7.48 — HIGH (ref 0–7.24)
TTG IGA SER-ACNC: <1.2 U/ML — SIGNIFICANT CHANGE UP
TTG IGA SER-ACNC: NEGATIVE — SIGNIFICANT CHANGE UP
TTG IGG SER IA-ACNC: NEGATIVE — SIGNIFICANT CHANGE UP
TTG IGG SER-ACNC: 2.9 U/ML — SIGNIFICANT CHANGE UP

## 2022-06-16 DIAGNOSIS — K83.8 OTHER SPECIFIED DISEASES OF BILIARY TRACT: ICD-10-CM

## 2022-06-16 DIAGNOSIS — K56.7 ILEUS, UNSPECIFIED: ICD-10-CM

## 2022-06-16 DIAGNOSIS — Z79.899 OTHER LONG TERM (CURRENT) DRUG THERAPY: ICD-10-CM

## 2022-06-16 DIAGNOSIS — E03.9 HYPOTHYROIDISM, UNSPECIFIED: ICD-10-CM

## 2022-06-16 DIAGNOSIS — R55 SYNCOPE AND COLLAPSE: ICD-10-CM

## 2022-06-16 DIAGNOSIS — K58.0 IRRITABLE BOWEL SYNDROME WITH DIARRHEA: ICD-10-CM

## 2022-06-16 DIAGNOSIS — Z98.84 BARIATRIC SURGERY STATUS: ICD-10-CM

## 2022-06-16 DIAGNOSIS — R13.10 DYSPHAGIA, UNSPECIFIED: ICD-10-CM

## 2022-06-16 DIAGNOSIS — Z79.82 LONG TERM (CURRENT) USE OF ASPIRIN: ICD-10-CM

## 2022-06-16 DIAGNOSIS — K21.9 GASTRO-ESOPHAGEAL REFLUX DISEASE WITHOUT ESOPHAGITIS: ICD-10-CM

## 2022-06-16 DIAGNOSIS — I48.92 UNSPECIFIED ATRIAL FLUTTER: ICD-10-CM

## 2022-06-16 DIAGNOSIS — K22.89 OTHER SPECIFIED DISEASE OF ESOPHAGUS: ICD-10-CM

## 2022-06-16 DIAGNOSIS — E86.0 DEHYDRATION: ICD-10-CM

## 2022-06-16 DIAGNOSIS — R10.9 UNSPECIFIED ABDOMINAL PAIN: ICD-10-CM

## 2022-06-17 ENCOUNTER — TRANSCRIPTION ENCOUNTER (OUTPATIENT)
Age: 67
End: 2022-06-17

## 2022-06-18 ENCOUNTER — LABORATORY RESULT (OUTPATIENT)
Age: 67
End: 2022-06-18

## 2022-06-21 ENCOUNTER — OUTPATIENT (OUTPATIENT)
Dept: OUTPATIENT SERVICES | Facility: HOSPITAL | Age: 67
LOS: 1 days | Discharge: HOME | End: 2022-06-21
Payer: COMMERCIAL

## 2022-06-21 ENCOUNTER — TRANSCRIPTION ENCOUNTER (OUTPATIENT)
Age: 67
End: 2022-06-21

## 2022-06-21 ENCOUNTER — RESULT REVIEW (OUTPATIENT)
Age: 67
End: 2022-06-21

## 2022-06-21 VITALS
TEMPERATURE: 97 F | SYSTOLIC BLOOD PRESSURE: 147 MMHG | HEART RATE: 80 BPM | DIASTOLIC BLOOD PRESSURE: 80 MMHG | RESPIRATION RATE: 18 BRPM | HEIGHT: 66 IN | WEIGHT: 179.9 LBS

## 2022-06-21 VITALS
RESPIRATION RATE: 21 BRPM | SYSTOLIC BLOOD PRESSURE: 128 MMHG | HEART RATE: 73 BPM | DIASTOLIC BLOOD PRESSURE: 60 MMHG | OXYGEN SATURATION: 98 % | TEMPERATURE: 97 F

## 2022-06-21 DIAGNOSIS — Z98.84 BARIATRIC SURGERY STATUS: Chronic | ICD-10-CM

## 2022-06-21 DIAGNOSIS — Z98.890 OTHER SPECIFIED POSTPROCEDURAL STATES: Chronic | ICD-10-CM

## 2022-06-21 LAB — CALPROTECTIN STL-MCNT: 30 UG/G — SIGNIFICANT CHANGE UP (ref 0–120)

## 2022-06-21 PROCEDURE — 43247 EGD REMOVE FOREIGN BODY: CPT | Mod: XU

## 2022-06-21 PROCEDURE — 45380 COLONOSCOPY AND BIOPSY: CPT

## 2022-06-21 PROCEDURE — 88305 TISSUE EXAM BY PATHOLOGIST: CPT | Mod: 26

## 2022-06-21 PROCEDURE — 43236 UPPR GI SCOPE W/SUBMUC INJ: CPT | Mod: XU

## 2022-06-21 PROCEDURE — 43239 EGD BIOPSY SINGLE/MULTIPLE: CPT | Mod: XS

## 2022-06-21 PROCEDURE — 43251 EGD REMOVE LESION SNARE: CPT | Mod: XU

## 2022-06-21 PROCEDURE — 88312 SPECIAL STAINS GROUP 1: CPT | Mod: 26

## 2022-06-21 RX ORDER — OMEPRAZOLE 10 MG/1
1 CAPSULE, DELAYED RELEASE ORAL
Qty: 0 | Refills: 0 | DISCHARGE

## 2022-06-21 RX ORDER — LEVOTHYROXINE SODIUM 125 MCG
1 TABLET ORAL
Qty: 0 | Refills: 0 | DISCHARGE

## 2022-06-21 NOTE — CHART NOTE - NSCHARTNOTEFT_GEN_A_CORE
PACU ANESTHESIA ADMISSION NOTE      Procedure:   Post op diagnosis:      ____  Intubated  TV:______       Rate: ______      FiO2: ______    ___x_  Patent Airway    __x__  Full return of protective reflexes    __x__  Full recovery from anesthesia / back to baseline status    Vitals:  temp(F) 98  /56  spo2 98  RR 17  pulse64    Mental Status:  _x ___ Awake   _____ Alert   _____ Drowsy   _____ Sedated    Nausea/Vomiting:  __x __ NO  ______Yes,   See Post - Op Orders          Pain Scale (0-10):  _____    Treatment: ____ None    _x ___ See Post - Op/PCA Orders    Post - Operative Fluids:   ____ Oral   __x __ See Post - Op Orders    Plan: Discharge:   _x ___Home       _____Floor     _____Critical Care    _____  Other:_________________    Comments: uneventful anesthesia course no complications. Vitals stable. Pt transferred to PACU

## 2022-06-21 NOTE — ASU PATIENT PROFILE, ADULT - FALL HARM RISK - UNIVERSAL INTERVENTIONS
Bed in lowest position, wheels locked, appropriate side rails in place/Call bell, personal items and telephone in reach/Instruct patient to call for assistance before getting out of bed or chair/Non-slip footwear when patient is out of bed/[place] to call system/Physically safe environment - no spills, clutter or unnecessary equipment/Purposeful Proactive Rounding/Room/bathroom lighting operational, light cord in reach

## 2022-06-21 NOTE — H&P PST ADULT - ASSESSMENT
66 F came for outpatient EGD for dysphagia and dilated esophagus ( h/o lap band sx) and colonoscopy for abdominal discomfort and diarrhea.

## 2022-06-22 ENCOUNTER — NON-APPOINTMENT (OUTPATIENT)
Age: 67
End: 2022-06-22

## 2022-06-22 LAB
SURGICAL PATHOLOGY STUDY: SIGNIFICANT CHANGE UP
SURGICAL PATHOLOGY STUDY: SIGNIFICANT CHANGE UP

## 2022-06-24 DIAGNOSIS — K64.8 OTHER HEMORRHOIDS: ICD-10-CM

## 2022-06-24 DIAGNOSIS — R10.9 UNSPECIFIED ABDOMINAL PAIN: ICD-10-CM

## 2022-06-24 DIAGNOSIS — K63.5 POLYP OF COLON: ICD-10-CM

## 2022-06-24 DIAGNOSIS — K31.7 POLYP OF STOMACH AND DUODENUM: ICD-10-CM

## 2022-06-24 DIAGNOSIS — K31.A11 GASTRIC INTESTINAL METAPLASIA WITHOUT DYSPLASIA, INVOLVING THE ANTRUM: ICD-10-CM

## 2022-06-24 DIAGNOSIS — K29.50 UNSPECIFIED CHRONIC GASTRITIS WITHOUT BLEEDING: ICD-10-CM

## 2022-06-24 DIAGNOSIS — Z98.84 BARIATRIC SURGERY STATUS: ICD-10-CM

## 2022-06-24 DIAGNOSIS — K21.9 GASTRO-ESOPHAGEAL REFLUX DISEASE WITHOUT ESOPHAGITIS: ICD-10-CM

## 2022-06-24 DIAGNOSIS — K62.1 RECTAL POLYP: ICD-10-CM

## 2022-06-24 DIAGNOSIS — K52.9 NONINFECTIVE GASTROENTERITIS AND COLITIS, UNSPECIFIED: ICD-10-CM

## 2022-06-24 DIAGNOSIS — E03.9 HYPOTHYROIDISM, UNSPECIFIED: ICD-10-CM

## 2023-10-12 ENCOUNTER — EMERGENCY (EMERGENCY)
Facility: HOSPITAL | Age: 68
LOS: 0 days | Discharge: HOME | End: 2023-10-12
Attending: EMERGENCY MEDICINE
Payer: MEDICARE

## 2023-10-12 VITALS
SYSTOLIC BLOOD PRESSURE: 142 MMHG | WEIGHT: 160.06 LBS | TEMPERATURE: 98 F | DIASTOLIC BLOOD PRESSURE: 87 MMHG | OXYGEN SATURATION: 98 % | RESPIRATION RATE: 18 BRPM | HEART RATE: 92 BPM | HEIGHT: 64 IN

## 2023-10-12 DIAGNOSIS — Z98.84 BARIATRIC SURGERY STATUS: Chronic | ICD-10-CM

## 2023-10-12 DIAGNOSIS — Z98.890 OTHER SPECIFIED POSTPROCEDURAL STATES: Chronic | ICD-10-CM

## 2023-10-12 DIAGNOSIS — R55 SYNCOPE AND COLLAPSE: ICD-10-CM

## 2023-10-12 DIAGNOSIS — E03.9 HYPOTHYROIDISM, UNSPECIFIED: ICD-10-CM
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
## 2023-10-12 DIAGNOSIS — M79.672 PAIN IN LEFT FOOT: ICD-10-CM

## 2023-10-12 DIAGNOSIS — I25.10 ATHEROSCLEROTIC HEART DISEASE OF NATIVE CORONARY ARTERY WITHOUT ANGINA PECTORIS: ICD-10-CM
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
## 2023-10-12 DIAGNOSIS — Z79.82 LONG TERM (CURRENT) USE OF ASPIRIN: ICD-10-CM

## 2023-10-12 DIAGNOSIS — S00.93XA CONTUSION OF UNSPECIFIED PART OF HEAD, INITIAL ENCOUNTER: ICD-10-CM

## 2023-10-12 DIAGNOSIS — E78.5 HYPERLIPIDEMIA, UNSPECIFIED: ICD-10-CM

## 2023-10-12 DIAGNOSIS — S09.90XA UNSPECIFIED INJURY OF HEAD, INITIAL ENCOUNTER: ICD-10-CM

## 2023-10-12 LAB
ALBUMIN SERPL ELPH-MCNC: 4.1 G/DL — SIGNIFICANT CHANGE UP (ref 3.5–5.2)
ALP SERPL-CCNC: 111 U/L — SIGNIFICANT CHANGE UP (ref 30–115)
ALT FLD-CCNC: 26 U/L — SIGNIFICANT CHANGE UP (ref 0–41)
ANION GAP SERPL CALC-SCNC: 11 MMOL/L — SIGNIFICANT CHANGE UP (ref 7–14)
AST SERPL-CCNC: 22 U/L — SIGNIFICANT CHANGE UP (ref 0–41)
BASOPHILS # BLD AUTO: 0.07 K/UL — SIGNIFICANT CHANGE UP (ref 0–0.2)
BASOPHILS NFR BLD AUTO: 0.7 % — SIGNIFICANT CHANGE UP (ref 0–1)
BILIRUB SERPL-MCNC: 0.3 MG/DL — SIGNIFICANT CHANGE UP (ref 0.2–1.2)
BUN SERPL-MCNC: 16 MG/DL — SIGNIFICANT CHANGE UP (ref 10–20)
CALCIUM SERPL-MCNC: 9.9 MG/DL — SIGNIFICANT CHANGE UP (ref 8.4–10.4)
CHLORIDE SERPL-SCNC: 102 MMOL/L — SIGNIFICANT CHANGE UP (ref 98–110)
CO2 SERPL-SCNC: 25 MMOL/L — SIGNIFICANT CHANGE UP (ref 17–32)
CREAT SERPL-MCNC: 1.1 MG/DL — SIGNIFICANT CHANGE UP (ref 0.7–1.5)
EGFR: 55 ML/MIN/1.73M2 — LOW
EOSINOPHIL # BLD AUTO: 0.22 K/UL — SIGNIFICANT CHANGE UP (ref 0–0.7)
EOSINOPHIL NFR BLD AUTO: 2.3 % — SIGNIFICANT CHANGE UP (ref 0–8)
GLUCOSE SERPL-MCNC: 101 MG/DL — HIGH (ref 70–99)
HCT VFR BLD CALC: 39.4 % — SIGNIFICANT CHANGE UP (ref 37–47)
HGB BLD-MCNC: 13.1 G/DL — SIGNIFICANT CHANGE UP (ref 12–16)
IMM GRANULOCYTES NFR BLD AUTO: 0.3 % — SIGNIFICANT CHANGE UP (ref 0.1–0.3)
LYMPHOCYTES # BLD AUTO: 1.83 K/UL — SIGNIFICANT CHANGE UP (ref 1.2–3.4)
LYMPHOCYTES # BLD AUTO: 18.8 % — LOW (ref 20.5–51.1)
MCHC RBC-ENTMCNC: 31 PG — SIGNIFICANT CHANGE UP (ref 27–31)
MCHC RBC-ENTMCNC: 33.2 G/DL — SIGNIFICANT CHANGE UP (ref 32–37)
MCV RBC AUTO: 93.1 FL — SIGNIFICANT CHANGE UP (ref 81–99)
MONOCYTES # BLD AUTO: 0.6 K/UL — SIGNIFICANT CHANGE UP (ref 0.1–0.6)
MONOCYTES NFR BLD AUTO: 6.2 % — SIGNIFICANT CHANGE UP (ref 1.7–9.3)
NEUTROPHILS # BLD AUTO: 6.96 K/UL — HIGH (ref 1.4–6.5)
NEUTROPHILS NFR BLD AUTO: 71.7 % — SIGNIFICANT CHANGE UP (ref 42.2–75.2)
NRBC # BLD: 0 /100 WBCS — SIGNIFICANT CHANGE UP (ref 0–0)
PLATELET # BLD AUTO: 322 K/UL — SIGNIFICANT CHANGE UP (ref 130–400)
PMV BLD: 9.2 FL — SIGNIFICANT CHANGE UP (ref 7.4–10.4)
POTASSIUM SERPL-MCNC: 4.8 MMOL/L — SIGNIFICANT CHANGE UP (ref 3.5–5)
POTASSIUM SERPL-SCNC: 4.8 MMOL/L — SIGNIFICANT CHANGE UP (ref 3.5–5)
PROT SERPL-MCNC: 6.8 G/DL — SIGNIFICANT CHANGE UP (ref 6–8)
RBC # BLD: 4.23 M/UL — SIGNIFICANT CHANGE UP (ref 4.2–5.4)
RBC # FLD: 13.7 % — SIGNIFICANT CHANGE UP (ref 11.5–14.5)
SODIUM SERPL-SCNC: 138 MMOL/L — SIGNIFICANT CHANGE UP (ref 135–146)
TROPONIN T SERPL-MCNC: <0.01 NG/ML — SIGNIFICANT CHANGE UP
WBC # BLD: 9.71 K/UL — SIGNIFICANT CHANGE UP (ref 4.8–10.8)
WBC # FLD AUTO: 9.71 K/UL — SIGNIFICANT CHANGE UP (ref 4.8–10.8)

## 2023-10-12 PROCEDURE — 73630 X-RAY EXAM OF FOOT: CPT | Mod: LT

## 2023-10-12 PROCEDURE — 80053 COMPREHEN METABOLIC PANEL: CPT

## 2023-10-12 PROCEDURE — 70450 CT HEAD/BRAIN W/O DYE: CPT | Mod: MA

## 2023-10-12 PROCEDURE — 85025 COMPLETE CBC W/AUTO DIFF WBC: CPT

## 2023-10-12 PROCEDURE — 70450 CT HEAD/BRAIN W/O DYE: CPT | Mod: 26,MA

## 2023-10-12 PROCEDURE — 84484 ASSAY OF TROPONIN QUANT: CPT

## 2023-10-12 PROCEDURE — 93005 ELECTROCARDIOGRAM TRACING: CPT

## 2023-10-12 PROCEDURE — 73630 X-RAY EXAM OF FOOT: CPT | Mod: 26,LT

## 2023-10-12 PROCEDURE — 99285 EMERGENCY DEPT VISIT HI MDM: CPT | Mod: 25

## 2023-10-12 PROCEDURE — 36415 COLL VENOUS BLD VENIPUNCTURE: CPT

## 2023-10-12 PROCEDURE — 99285 EMERGENCY DEPT VISIT HI MDM: CPT | Mod: GC

## 2023-10-12 PROCEDURE — 93010 ELECTROCARDIOGRAM REPORT: CPT

## 2023-10-12 RX ORDER — ACETAMINOPHEN 500 MG
650 TABLET ORAL ONCE
Refills: 0 | Status: COMPLETED | OUTPATIENT
Start: 2023-10-12 | End: 2023-10-12

## 2023-10-12 RX ORDER — SODIUM CHLORIDE 9 MG/ML
1000 INJECTION, SOLUTION INTRAVENOUS ONCE
Refills: 0 | Status: COMPLETED | OUTPATIENT
Start: 2023-10-12 | End: 2023-10-12

## 2023-10-12 RX ADMIN — SODIUM CHLORIDE 1000 MILLILITER(S): 9 INJECTION, SOLUTION INTRAVENOUS at 17:03

## 2023-10-12 RX ADMIN — Medication 650 MILLIGRAM(S): at 17:03

## 2023-10-12 NOTE — ED PROVIDER NOTE - ATTENDING CONTRIBUTION TO CARE
68-year-old female to ED status post syncopal episode.  Patient was caring for her grandson in the CAT scan room when she had a vasovagal episode.  Has a history of vasovagal syncope immediately returned to baseline well-appearing nontoxic has no complaints. c/o pain to L foot as it was hit during the fall.    AVSS, exam as noted, CTAB, RRR, abdomen soft NTND, (+) bowel sounds, neuro nonfocal

## 2023-10-12 NOTE — ED PROVIDER NOTE - PATIENT PORTAL LINK FT
You can access the FollowMyHealth Patient Portal offered by [facility] by registering at the following website: [email or web address]. By joining [employer]’s FollowMyHealth portal, you will also be able to view your health information using other applications (apps) compatible with our system.

## 2023-10-12 NOTE — ED PROVIDER NOTE - OBJECTIVE STATEMENT
Patient is a 68-year-old woman with a history of hypothyroidism, hyperlipidemia, nonobstructive CAD on ASA 81 daily, vasovagal syncope, presenting status post syncope today.  Patient states she was in the ED with her grandson who is a patient in the [facility] ED, and she was helping hold him in CT scanner.  Patient started to feel nervous, and had prodromal symptoms that she normally has prior to vasovagal syncopes with tunnel vision, lightheadedness, warmth.  She fell to the ground from standing height, hitting the back of her head, and striking her left foot on the bottom of the CT scan. No anticoagulation use.  Patient feels completely asymptomatic now, aside from pain on the medial side of the left foot.

## 2023-10-12 NOTE — ED PROVIDER NOTE - PROGRESS NOTE DETAILS
Resident SYD Carrion: Patient remains feeling well. Ambulating steadily. Eager for discharge. Results reviewed and discussed with patient. Return precautions given.

## 2023-10-12 NOTE — ED PROVIDER NOTE - NSFOLLOWUPINSTRUCTIONS_ED_ALL_ED_FT
You were evaluated in the Emergency Department today, and your visit did not reveal anything immediately life-threatening.    However, it is important that you follow-up with your PRIMARY CARE PHYSICIAN regularly.  ------------------------------------------------------------------------------------------------------------------------  Syncope    Syncope is when you temporarily lose consciousness, also called fainting or passing out. It is caused by a sudden decrease in blood flow to the brain. Even though most causes of syncope are not dangerous, syncope can possibly be a sign of a serious medical problem. Signs that you may be about to faint include feeling dizzy, lightheaded, nausea, visual changes, or cold/clammy skin. Do not drive, operate heavy machinery, or play sports until your health care provider says it is okay.    SEEK IMMEDIATE MEDICAL CARE IF YOU HAVE ANY OF THE FOLLOWING SYMPTOMS: severe headache, pain in your chest/abdomen/back, bleeding from your mouth or rectum, palpitations, shortness of breath, pain with breathing, seizure, confusion, or trouble walking.  ------------------------------------------------------------------------------------------------------------------------  Musculoskeletal Pain    Musculoskeletal pain refers to aches and pains in your bones, joints, muscles, and the tissues that surround them. This pain can occur in any part of the body. It can last for a short time (acute) or a long time (chronic).    Follow these instructions at home:  Managing pain, stiffness, and swelling   - Treatment may include medicines for pain and inflammation that are taken by mouth or applied to the skin. Take over-the-counter and prescription medicines only as told by your health care provider.  - When your pain is severe, bed rest may be helpful. Lie or sit in any position that is comfortable, but get out of bed and walk around at least every couple of hours.  - If directed, put ice on the painful area. To do this:  - Put ice in a plastic bag.  - Place a towel between your skin and the bag.  - Leave the ice on for 20 minutes, 2–3 times a day.  - Remove the ice if your skin turns bright red. This is very important. If you cannot feel pain, heat, or cold, you have a greater risk of damage to the area.    General instructions   - Your health care provider may recommend that you see a physical therapist. This person can help you come up with a safe exercise program.  - If told by your health care provider, do physical therapy exercises to improve movement and strength in the affected area.  - Keep all follow-up visits. This is important. This includes any physical therapy visits.    Contact a health care provider if:  - Your pain gets worse.  - Medicines do not help ease your pain.  - You cannot use the part of your body that hurts, such as your arm, leg, or neck.  - You have trouble sleeping.  - You have trouble doing your normal activities.    Get help right away if:  - You have a new injury and your pain is worse or different.  - You feel numb or you have tingling in the painful area.  ------------------------------------------------------------------------------------------------------------------------  Hematoma    A hematoma is a collection of blood. A hematoma can happen:  Under the skin.  In an organ.  In a body space.  In a joint space.  In other tissues.  The blood can thicken (clot) to form a lump that you can see and feel. The lump is often hard and may become sore and tender. The lump can be very small or very big. Most hematomas get better in a few days to weeks. However, some of these may be serious and need medical care.    What are the causes?  This condition is caused by:  An injury.  Blood that leaks under the skin.  Problems from surgeries.  Medical conditions that cause bleeding or bruising.    What increases the risk?  You are more likely to develop this condition if:  You are an older adult.  You use medicines that thin your blood.  You use NSAIDs, such as ibuprofen, often for pain.  You play contact sports.    What are the signs or symptoms?  Comparison of a normal ankle and an ankle that is swollen and bruised.  Symptoms depend on where the hematoma is in your body.  If the hematoma is under the skin, there is:  A firm lump on the body.  Pain and tenderness in the area.  Bruising. The skin above the lump may be blue, dark blue, purple-red, or yellowish.  If the hematoma is deep in the tissues or body spaces, there may be:  Blood in the stomach. This may cause pain in the belly (abdomen), weakness, passing out (fainting), and shortness of breath.  Blood in the head. This may cause a headache, weakness, trouble speaking or understanding speech, or passing out.    How is this treated?  Treatment depends on the cause, size, and location of the hematoma. Treatment may include:  Doing nothing. Many hematomas go away on their own without treatment.  Surgery or close monitoring. This may be needed for large hematomas or hematomas that affect the body's organs.    Medicines. These may be given if a medical condition caused the hematoma.  Follow these instructions at home:  Managing pain, stiffness, and swelling  Bag of ice on a towel on the skin.  If told, put ice on the injured area. To do this:  Put ice in a plastic bag.  Place a towel between your skin and the bag.  Leave the ice on for 20 minutes, 2–3 times a day for the first two days.  If your skin turns bright red, take off the ice right away to prevent skin damage. The risk of skin damage is higher if you cannot feel pain, heat, or cold.    General instructions  Take over-the-counter and prescription medicines only as told by your doctor.  Keep all follow-up visits. Your doctor may want to see how your hematoma is healing with treatment.    Contact a doctor if:  You have a fever.  The swelling or bruising gets worse.  You start to get more hematomas.  Your pain gets worse.  Your pain is not getting better with medicine.  The skin over the hematoma breaks or starts to bleed.    Get help right away if:  Your hematoma is in your chest or belly and you:  Pass out.  Feel weak.  Become short of breath.  You have a hematoma on your scalp that is caused by a fall or injury, and you:  Have a headache that gets worse.  Have trouble speaking or understanding speech.  Become less alert or you pass out.  These symptoms may be an emergency. Get help right away. Call 911.  Do not wait to see if the symptoms will go away.  Do not drive yourself to the hospital

## 2023-10-12 NOTE — ED ADULT TRIAGE NOTE - CHIEF COMPLAINT QUOTE
pt s.p syncopal episode. pt hit back of head. denies anticoagulant use. pt has hx of vasovagal syndrome

## 2023-10-12 NOTE — ED PROVIDER NOTE - CARE PLAN
1 Principal Discharge DX:	Syncope  Secondary Diagnosis:	Left foot pain  Secondary Diagnosis:	Traumatic hematoma of head

## 2023-10-12 NOTE — ED ADULT NURSE NOTE - NSFALLRISKINTERV_ED_ALL_ED

## 2023-10-12 NOTE — ED ADULT NURSE NOTE - OBJECTIVE STATEMENT
pt s/p syncopal episode, was watching grandson have a procedure done when she passed out. (+)ht (-)loc (-)ac. HX of vasovagal syndrome

## 2023-10-12 NOTE — ED PROVIDER NOTE - PHYSICAL EXAMINATION
_  Vital signs reviewed; ABCs intact  GENERAL: Well nourished, comfortable  SKIN: Warm, dry  HEAD & NECK: NCAT, supple neck  EYES: EOMI, PER B/L, no conjunctival pallor  ENT: MMM  CARD: RRR, S1, S2; no murmurs, no rubs, no gallops; no edema; no calf tenderness  RESP: Normal respiratory effort, CTAB, no rales, no wheezing  ABD: Soft, ND, NT, no rebound, no guarding  NEURO: CN II-XII intact; normal cerebellar testing (finger-to-nose, heel-to-shin); no pronator drift; normal gait; sensation intact throughout  MSK: LLE foot with mild TTP to 1st metatarsal without surrounding ecchymosis or swelling; no joint laxity or deformity; DP pulse 2+; full ROM at foot and ankle; motor strength 5/5; sensation intact  PSYCH: AAOx3, cooperative, appropriate

## 2023-10-15 ENCOUNTER — APPOINTMENT (OUTPATIENT)
Dept: ORTHOPEDIC SURGERY | Facility: CLINIC | Age: 68
End: 2023-10-15
Payer: MEDICARE

## 2023-10-15 VITALS — HEIGHT: 66 IN | BODY MASS INDEX: 28.93 KG/M2 | WEIGHT: 180 LBS

## 2023-10-15 DIAGNOSIS — S93.692A OTHER SPRAIN OF LEFT FOOT, INITIAL ENCOUNTER: ICD-10-CM

## 2023-10-15 PROCEDURE — 99203 OFFICE O/P NEW LOW 30 MIN: CPT

## 2023-10-15 PROCEDURE — L4361: CPT | Mod: KX,LT

## 2023-10-15 RX ORDER — LEVOTHYROXINE SODIUM 137 UG/1
TABLET ORAL
Refills: 0 | Status: ACTIVE | COMMUNITY

## 2023-10-15 RX ORDER — NAPROXEN 500 MG/1
500 TABLET ORAL
Qty: 28 | Refills: 0 | Status: ACTIVE | COMMUNITY
Start: 2023-10-15 | End: 1900-01-01

## 2023-11-09 ENCOUNTER — APPOINTMENT (OUTPATIENT)
Dept: ORTHOPEDIC SURGERY | Facility: CLINIC | Age: 68
End: 2023-11-09
Payer: MEDICARE

## 2023-11-09 DIAGNOSIS — S90.32XA CONTUSION OF LEFT FOOT, INITIAL ENCOUNTER: ICD-10-CM

## 2023-11-09 DIAGNOSIS — M19.079 PRIMARY OSTEOARTHRITIS, UNSPECIFIED ANKLE AND FOOT: ICD-10-CM

## 2023-11-09 PROCEDURE — 99213 OFFICE O/P EST LOW 20 MIN: CPT

## 2023-11-09 RX ORDER — DICLOFENAC SODIUM 1% 10 MG/G
1 GEL TOPICAL
Qty: 1 | Refills: 0 | Status: ACTIVE | COMMUNITY
Start: 2023-11-09 | End: 1900-01-01

## 2023-12-28 NOTE — ASU PREOP CHECKLIST - HAND OFF
Received report from PATITO Forbes. Patient quietly lying in bed watching television at this time, no distress noted.     Holding RN to OR RN

## 2024-10-05 NOTE — ED PROVIDER NOTE - NS ED MD DISPO ADMITTING SERVICE
You have nonspecific abnormalities in your urine and the urine culture is pending.  I will make recommendations once the urine culture becomes available.         Your uric acid level is well-controlled and within goal.          Your fasting blood sugar and cholesterol levels are normal and within goal.  I recommend that you eat healthy, eat a low-fat low-cholesterol diet, lose some weight and get 30 minutes of exercise 5 days a week.           Please make a return appointment to see me in 6 months and we will recheck your fasting blood work at that time.        MED

## 2025-07-30 NOTE — H&P PST ADULT - HISTORY OF PRESENT ILLNESS
July 31, 2025      Janice Ulloa  [address]  [place] [address]        Dear Janice    We are writing to inform you of your test results.    Your thyroid level is normal.  Your complete blood count is normal as well.   Anisha Alfaro PA-C     Resulted Orders   CBC with platelets   Result Value Ref Range    WBC Count 6.7 4.0 - 11.0 10e3/uL    RBC Count 4.72 3.80 - 5.20 10e6/uL    Hemoglobin 13.6 11.7 - 15.7 g/dL    Hematocrit 40.4 35.0 - 47.0 %    MCV 86 78 - 100 fL    MCH 28.8 26.5 - 33.0 pg    MCHC 33.7 31.5 - 36.5 g/dL    RDW 12.3 10.0 - 15.0 %    Platelet Count 232 150 - 450 10e3/uL   TSH with free T4 reflex   Result Value Ref Range    TSH 3.02 0.30 - 4.20 uIU/mL       If you have any questions or concerns, please call the clinic at the number listed above.            66 F came for outpatient EGD for dysphagia and dilated esophagus ( h/o lap band sx) and colonoscopy for abdominal discomfort and diarrhea.